# Patient Record
Sex: MALE | Race: WHITE | NOT HISPANIC OR LATINO | Employment: FULL TIME | URBAN - METROPOLITAN AREA
[De-identification: names, ages, dates, MRNs, and addresses within clinical notes are randomized per-mention and may not be internally consistent; named-entity substitution may affect disease eponyms.]

---

## 2017-08-01 ENCOUNTER — ALLSCRIPTS OFFICE VISIT (OUTPATIENT)
Dept: OTHER | Facility: OTHER | Age: 57
End: 2017-08-01

## 2017-08-01 LAB
BILIRUB UR QL STRIP: ABNORMAL
CLARITY UR: ABNORMAL
COLOR UR: YELLOW
GLUCOSE (HISTORICAL): ABNORMAL
HGB UR QL STRIP.AUTO: ABNORMAL
KETONES UR STRIP-MCNC: ABNORMAL MG/DL
LEUKOCYTE ESTERASE UR QL STRIP: ABNORMAL
NITRITE UR QL STRIP: ABNORMAL
PH UR STRIP.AUTO: 5.5 [PH]
PROT UR STRIP-MCNC: ABNORMAL MG/DL
SP GR UR STRIP.AUTO: 1.02
UROBILINOGEN UR QL STRIP.AUTO: 0.2

## 2017-08-03 ENCOUNTER — OFFICE VISIT (OUTPATIENT)
Dept: URGENT CARE | Age: 57
End: 2017-08-03
Payer: COMMERCIAL

## 2017-08-03 PROCEDURE — 99213 OFFICE O/P EST LOW 20 MIN: CPT

## 2017-12-29 ENCOUNTER — ALLSCRIPTS OFFICE VISIT (OUTPATIENT)
Dept: OTHER | Facility: OTHER | Age: 57
End: 2017-12-29

## 2017-12-29 LAB
BILIRUB UR QL STRIP: NORMAL
GLUCOSE (HISTORICAL): NORMAL
HGB UR QL STRIP.AUTO: NORMAL
KETONES UR STRIP-MCNC: NORMAL MG/DL
LEUKOCYTE ESTERASE UR QL STRIP: NORMAL
NITRITE UR QL STRIP: NORMAL
PH UR STRIP.AUTO: 6 [PH]
PROT UR STRIP-MCNC: NORMAL MG/DL
SP GR UR STRIP.AUTO: 1.02
UROBILINOGEN UR QL STRIP.AUTO: 0.2

## 2018-01-14 VITALS
BODY MASS INDEX: 25.84 KG/M2 | WEIGHT: 195 LBS | HEIGHT: 73 IN | DIASTOLIC BLOOD PRESSURE: 72 MMHG | SYSTOLIC BLOOD PRESSURE: 104 MMHG

## 2018-01-23 VITALS
DIASTOLIC BLOOD PRESSURE: 80 MMHG | WEIGHT: 195 LBS | HEIGHT: 72 IN | BODY MASS INDEX: 26.41 KG/M2 | RESPIRATION RATE: 16 BRPM | SYSTOLIC BLOOD PRESSURE: 120 MMHG

## 2018-02-01 DIAGNOSIS — R97.20 ELEVATED PROSTATE SPECIFIC ANTIGEN (PSA): ICD-10-CM

## 2018-02-14 ENCOUNTER — TELEPHONE (OUTPATIENT)
Dept: UROLOGY | Facility: AMBULATORY SURGERY CENTER | Age: 58
End: 2018-02-14

## 2018-02-14 NOTE — TELEPHONE ENCOUNTER
Isaias Hi from Dr Vikash Pete's office called  She needs this patient's prostate biopsy results from 2014 and 2016 faxed to her at [phone]  Dr Felix Powers did the biopsies  Please call her with any questions  Thanks

## 2018-03-08 ENCOUNTER — LAB REQUISITION (OUTPATIENT)
Dept: LAB | Facility: HOSPITAL | Age: 58
End: 2018-03-08
Payer: COMMERCIAL

## 2018-03-08 DIAGNOSIS — R97.20 ELEVATED PROSTATE SPECIFIC ANTIGEN (PSA): ICD-10-CM

## 2018-03-09 ENCOUNTER — TELEPHONE (OUTPATIENT)
Dept: UROLOGY | Facility: MEDICAL CENTER | Age: 58
End: 2018-03-09

## 2018-03-09 PROCEDURE — 88363 XM ARCHIVE TISSUE MOLEC ANAL: CPT | Performed by: PATHOLOGY

## 2018-06-21 LAB
ALBUMIN SERPL-MCNC: 4.5 G/DL (ref 3.5–5.5)
ALBUMIN/GLOB SERPL: 1.7 {RATIO} (ref 1.2–2.2)
ALP SERPL-CCNC: 57 IU/L (ref 39–117)
ALT SERPL-CCNC: 24 IU/L (ref 0–44)
AMBIG ABBREV DEFAULT: NORMAL
AST SERPL-CCNC: 21 IU/L (ref 0–40)
BILIRUB SERPL-MCNC: 0.5 MG/DL (ref 0–1.2)
BUN SERPL-MCNC: 21 MG/DL (ref 6–24)
BUN/CREAT SERPL: 16 (ref 9–20)
CALCIUM SERPL-MCNC: 9.5 MG/DL (ref 8.7–10.2)
CHLORIDE SERPL-SCNC: 101 MMOL/L (ref 96–106)
CO2 SERPL-SCNC: 22 MMOL/L (ref 20–29)
CREAT SERPL-MCNC: 1.32 MG/DL (ref 0.76–1.27)
GLOBULIN SER-MCNC: 2.6 G/DL (ref 1.5–4.5)
GLUCOSE SERPL-MCNC: 89 MG/DL (ref 65–99)
POTASSIUM SERPL-SCNC: 4.5 MMOL/L (ref 3.5–5.2)
PROT SERPL-MCNC: 7.1 G/DL (ref 6–8.5)
PSA FREE MFR SERPL: 34.4 %
PSA FREE SERPL-MCNC: 2.75 NG/ML
PSA SERPL-MCNC: 8 NG/ML (ref 0–4)
SL AMB EGFR AFRICAN AMERICAN: 69 ML/MIN/1.73
SL AMB EGFR NON AFRICAN AMERICAN: 60 ML/MIN/1.73
SODIUM SERPL-SCNC: 141 MMOL/L (ref 134–144)

## 2018-06-25 RX ORDER — MULTIVITAMIN WITH IRON
TABLET ORAL
COMMUNITY
Start: 2017-12-29 | End: 2019-08-12

## 2018-06-25 RX ORDER — IBUPROFEN 800 MG
TABLET ORAL
COMMUNITY
Start: 2017-12-29 | End: 2019-08-12

## 2018-06-29 ENCOUNTER — OFFICE VISIT (OUTPATIENT)
Dept: UROLOGY | Facility: MEDICAL CENTER | Age: 58
End: 2018-06-29
Payer: COMMERCIAL

## 2018-06-29 VITALS
SYSTOLIC BLOOD PRESSURE: 108 MMHG | DIASTOLIC BLOOD PRESSURE: 80 MMHG | WEIGHT: 190 LBS | HEIGHT: 72 IN | BODY MASS INDEX: 25.73 KG/M2

## 2018-06-29 DIAGNOSIS — Z90.5 HISTORY OF NEPHRECTOMY: ICD-10-CM

## 2018-06-29 DIAGNOSIS — N40.1 BENIGN PROSTATIC HYPERPLASIA WITH LOWER URINARY TRACT SYMPTOMS, SYMPTOM DETAILS UNSPECIFIED: ICD-10-CM

## 2018-06-29 DIAGNOSIS — Z85.528 PERSONAL HISTORY OF OTHER MALIGNANT NEOPLASM OF KIDNEY: ICD-10-CM

## 2018-06-29 DIAGNOSIS — R97.20 ELEVATED PSA: Primary | ICD-10-CM

## 2018-06-29 LAB
SL AMB  POCT GLUCOSE, UA: ABNORMAL
SL AMB LEUKOCYTE ESTERASE,UA: ABNORMAL
SL AMB POCT BILIRUBIN,UA: ABNORMAL
SL AMB POCT BLOOD,UA: ABNORMAL
SL AMB POCT CLARITY,UA: CLEAR
SL AMB POCT COLOR,UA: YELLOW
SL AMB POCT KETONES,UA: ABNORMAL
SL AMB POCT NITRITE,UA: ABNORMAL
SL AMB POCT PH,UA: 5.5
SL AMB POCT SPECIFIC GRAVITY,UA: 1.02
SL AMB POCT URINE PROTEIN: ABNORMAL
SL AMB POCT UROBILINOGEN: 0.2

## 2018-06-29 PROCEDURE — 81003 URINALYSIS AUTO W/O SCOPE: CPT | Performed by: UROLOGY

## 2018-06-29 PROCEDURE — 99214 OFFICE O/P EST MOD 30 MIN: CPT | Performed by: UROLOGY

## 2018-06-29 NOTE — PROGRESS NOTES
Assessment/Plan:      Diagnoses and all orders for this visit:    Elevated PSA    Benign prostatic hyperplasia with lower urinary tract symptoms, symptom details unspecified  -     POCT urine dip auto non-scope    Personal history of other malignant neoplasm of kidney  -     Comprehensive metabolic panel; Future  -     CT abdomen pelvis w contrast; Future  -     XR chest pa & lateral; Future    History of nephrectomy    Other orders  -     Cholecalciferol (VITAMIN D3) 400 units CAPS; Take by mouth  -     Omega-3 Fatty Acids (FISH OIL CONCENTRATE) 300 MG CAPS; Take by mouth          Subjective:  No complaints     Patient ID: Raine Stephenson is a 65 y o  male  HPI  He is 2 years after his laparoscopic robotic right radical nephrectomy for pT1a RCC  He has no remarkable related problems or complaints  He denies any hematuria flank pains weight loss or loss of appetite  He has a history of BPH and we had had him on Flomax previously but he is currently voiding well without being on Flomax  He denies any obstructive urinary symptoms, and he has not had any problem getting his stream started or emptying his bladder  He has had elevated PSA between 8 and 9 for the past few years  He is undergone at least 2 negative prostate biopsies previously, the last of which was in 2016  Review of Systems   Constitutional: Negative  HENT: Negative  Eyes: Negative  Respiratory: Negative  Cardiovascular: Negative  Gastrointestinal: Negative  Endocrine: Negative  Genitourinary: Negative  Musculoskeletal: Negative  Skin: Negative  Allergic/Immunologic: Negative  Neurological: Negative  Hematological: Negative  Psychiatric/Behavioral: Negative  Objective:     Physical Exam   Constitutional: He is oriented to person, place, and time  He appears well-developed and well-nourished  No distress  HENT:   Head: Normocephalic and atraumatic     Nose: Nose normal    Mouth/Throat: Oropharynx is clear and moist    Eyes: Conjunctivae are normal  No scleral icterus  Neck: Normal range of motion  Neck supple  Pulmonary/Chest: Effort normal  No respiratory distress  Abdominal: Soft  Bowel sounds are normal  He exhibits no distension  There is no CVA tenderness  No hernia  Hernia confirmed negative in the ventral area  Musculoskeletal: Normal range of motion  He exhibits no edema or tenderness  Lymphadenopathy:     He has no cervical adenopathy  Neurological: He is alert and oriented to person, place, and time  No cranial nerve deficit  Skin: Skin is warm and dry  No erythema  No pallor  Psychiatric: He has a normal mood and affect  His behavior is normal  Judgment and thought content normal    Vitals reviewed        PSA from 06/20/2018 was 8 0 with a percent free of 34 4%, which is stable for him    His last surveillance imaging studies were from 1 year ago

## 2018-07-05 ENCOUNTER — HOSPITAL ENCOUNTER (OUTPATIENT)
Dept: RADIOLOGY | Facility: HOSPITAL | Age: 58
Discharge: HOME | End: 2018-07-05
Attending: UROLOGY
Payer: COMMERCIAL

## 2018-07-05 DIAGNOSIS — Z85.528 PERSONAL HISTORY OF OTHER MALIGNANT NEOPLASM OF KIDNEY: ICD-10-CM

## 2018-07-05 PROCEDURE — 71046 X-RAY EXAM CHEST 2 VIEWS: CPT

## 2018-07-18 ENCOUNTER — HOSPITAL ENCOUNTER (OUTPATIENT)
Dept: CT IMAGING | Facility: HOSPITAL | Age: 58
Discharge: HOME | End: 2018-07-18
Attending: UROLOGY
Payer: COMMERCIAL

## 2018-07-18 DIAGNOSIS — Z85.528 PERSONAL HISTORY OF OTHER MALIGNANT NEOPLASM OF KIDNEY: ICD-10-CM

## 2018-07-18 PROCEDURE — 74177 CT ABD & PELVIS W/CONTRAST: CPT

## 2018-07-18 RX ADMIN — IODIXANOL 100 ML: 320 INJECTION, SOLUTION INTRAVASCULAR at 07:50

## 2018-07-20 ENCOUNTER — TELEPHONE (OUTPATIENT)
Dept: OTHER | Facility: HOSPITAL | Age: 58
End: 2018-07-20

## 2018-07-23 ENCOUNTER — TELEPHONE (OUTPATIENT)
Dept: UROLOGY | Facility: MEDICAL CENTER | Age: 58
End: 2018-07-23

## 2018-07-23 NOTE — TELEPHONE ENCOUNTER
Patient called back to say the nurse stated the name was Kishor James on the answering machine, but he sees Lor  I assured him from the notes left by Lauren Pringle that Lor approved her calling him on his behalf

## 2019-01-11 ENCOUNTER — OFFICE VISIT (OUTPATIENT)
Dept: UROLOGY | Facility: MEDICAL CENTER | Age: 59
End: 2019-01-11
Payer: COMMERCIAL

## 2019-01-11 VITALS
WEIGHT: 188 LBS | SYSTOLIC BLOOD PRESSURE: 130 MMHG | HEIGHT: 72 IN | BODY MASS INDEX: 25.47 KG/M2 | HEART RATE: 72 BPM | DIASTOLIC BLOOD PRESSURE: 82 MMHG

## 2019-01-11 DIAGNOSIS — Z90.5 HISTORY OF NEPHRECTOMY: ICD-10-CM

## 2019-01-11 DIAGNOSIS — N40.1 BENIGN PROSTATIC HYPERPLASIA WITH LOWER URINARY TRACT SYMPTOMS, SYMPTOM DETAILS UNSPECIFIED: ICD-10-CM

## 2019-01-11 DIAGNOSIS — R97.20 ELEVATED PSA: Primary | ICD-10-CM

## 2019-01-11 DIAGNOSIS — Z85.528 PERSONAL HISTORY OF OTHER MALIGNANT NEOPLASM OF KIDNEY: ICD-10-CM

## 2019-01-11 DIAGNOSIS — R31.29 MICROSCOPIC HEMATURIA: ICD-10-CM

## 2019-01-11 LAB
SL AMB  POCT GLUCOSE, UA: NEGATIVE
SL AMB LEUKOCYTE ESTERASE,UA: NEGATIVE
SL AMB POCT BILIRUBIN,UA: NEGATIVE
SL AMB POCT BLOOD,UA: ABNORMAL
SL AMB POCT CLARITY,UA: CLEAR
SL AMB POCT COLOR,UA: YELLOW
SL AMB POCT KETONES,UA: NEGATIVE
SL AMB POCT NITRITE,UA: NEGATIVE
SL AMB POCT PH,UA: 6
SL AMB POCT SPECIFIC GRAVITY,UA: 1.02
SL AMB POCT URINE PROTEIN: NEGATIVE
SL AMB POCT UROBILINOGEN: 0.2

## 2019-01-11 PROCEDURE — 99214 OFFICE O/P EST MOD 30 MIN: CPT | Performed by: UROLOGY

## 2019-01-11 PROCEDURE — 81003 URINALYSIS AUTO W/O SCOPE: CPT | Performed by: UROLOGY

## 2019-01-11 NOTE — PROGRESS NOTES
Assessment/Plan:      Diagnoses and all orders for this visit:    Elevated PSA  -     PSA, total and free; Future    Benign prostatic hyperplasia with lower urinary tract symptoms, symptom details unspecified  -     PSA, total and free; Future    Microscopic hematuria  -     POCT urine dip auto non-scope    Personal history of other malignant neoplasm of kidney  -     US kidney and bladder; Future    History of nephrectomy  -     US kidney and bladder; Future          Subjective:  No complaints     Patient ID: Authur Sever is a 63 y o  male  HPI  He is 2-1/2 years after his laparoscopic robotic right radical nephrectomy for pT1a RCC  He has no remarkable related problems or complaints  He denies any hematuria flank pains weight loss or loss of appetite      He has a history of BPH and we had had him on Flomax previously but he is currently voiding well without being on Flomax  He denies any obstructive urinary symptoms, and he has not had any problem getting his stream started or emptying his bladder  He has had elevated PSA between 8 and 9 for the past few years  He is undergone at least 2 negative prostate biopsies previously, the last of which was in 2016  Review of Systems   Constitutional: Negative  HENT: Negative  Eyes: Negative  Respiratory: Negative  Cardiovascular: Negative  Gastrointestinal: Negative  Endocrine: Negative  Genitourinary: Negative  Musculoskeletal: Negative  Skin: Negative  Allergic/Immunologic: Negative  Neurological: Negative  Hematological: Negative  Psychiatric/Behavioral: Negative  Objective:     Physical Exam   Constitutional: He is oriented to person, place, and time  He appears well-developed and well-nourished  No distress  HENT:   Head: Normocephalic and atraumatic  Nose: Nose normal    Mouth/Throat: Oropharynx is clear and moist    Eyes: Pupils are equal, round, and reactive to light   Conjunctivae and EOM are normal  No scleral icterus  Neck: Normal range of motion  Neck supple  Cardiovascular: Normal rate, regular rhythm, normal heart sounds and intact distal pulses  No murmur heard  Pulmonary/Chest: Effort normal and breath sounds normal  No respiratory distress  He has no wheezes  He has no rales  Abdominal: Soft  Bowel sounds are normal  He exhibits no distension and no mass  There is no tenderness  Musculoskeletal: Normal range of motion  He exhibits no edema or tenderness  Lymphadenopathy:     He has no cervical adenopathy  Neurological: He is alert and oriented to person, place, and time  No cranial nerve deficit  Skin: Skin is warm and dry  No rash noted  No erythema  No pallor  Psychiatric: He has a normal mood and affect  His behavior is normal  Judgment and thought content normal    Nursing note and vitals reviewed  CT scan of the abdomen pelvis, and chest x-ray from 07/18/2018: IMPRESSION:  No signs of metastatic disease, residual or recurrent renal cell carcinoma in this patient status post right nephrectomy  Prostamegaly  Colonic diverticulosis without diverticulitis    IMPRESSION:  No acute cardiopulmonary disease    PSA from 06/20/2018 was 8 0, with a% free of 34 4 %    Patient states a few weeks ago he had a blood test done through his medical doctor's office and his PSA was 9 9

## 2019-01-11 NOTE — LETTER
January 11, 2019     [email or web address]  [address] [place] [address]    Patient: Lisha Wood   YOB: 1960   Date of Visit: 1/11/2019       Dear Dr Luann Rousseau:    Thank you for referring Sharon Yu to me for evaluation  Below are my notes for this consultation  If you have questions, please do not hesitate to call me  I look forward to following your patient along with you           Sincerely,        Brandi Loredo MD        CC: No Recipients

## 2019-06-21 ENCOUNTER — TELEPHONE (OUTPATIENT)
Dept: UROLOGY | Facility: MEDICAL CENTER | Age: 59
End: 2019-06-21

## 2019-08-12 ENCOUNTER — OFFICE VISIT (OUTPATIENT)
Dept: UROLOGY | Facility: MEDICAL CENTER | Age: 59
End: 2019-08-12
Payer: COMMERCIAL

## 2019-08-12 ENCOUNTER — TELEPHONE (OUTPATIENT)
Dept: UROLOGY | Facility: MEDICAL CENTER | Age: 59
End: 2019-08-12

## 2019-08-12 VITALS
HEART RATE: 58 BPM | SYSTOLIC BLOOD PRESSURE: 126 MMHG | HEIGHT: 72 IN | WEIGHT: 192 LBS | DIASTOLIC BLOOD PRESSURE: 78 MMHG | BODY MASS INDEX: 26.01 KG/M2

## 2019-08-12 DIAGNOSIS — Z98.890 STATUS POST ROBOT-ASSISTED SURGICAL PROCEDURE: ICD-10-CM

## 2019-08-12 DIAGNOSIS — R97.20 ELEVATED PSA: ICD-10-CM

## 2019-08-12 DIAGNOSIS — Z90.5 HISTORY OF RIGHT RADICAL NEPHRECTOMY: ICD-10-CM

## 2019-08-12 DIAGNOSIS — N40.1 BENIGN PROSTATIC HYPERPLASIA WITH LOWER URINARY TRACT SYMPTOMS, SYMPTOM DETAILS UNSPECIFIED: Primary | ICD-10-CM

## 2019-08-12 DIAGNOSIS — Z85.528 PERSONAL HISTORY OF OTHER MALIGNANT NEOPLASM OF KIDNEY: ICD-10-CM

## 2019-08-12 PROCEDURE — 99214 OFFICE O/P EST MOD 30 MIN: CPT | Performed by: UROLOGY

## 2019-08-12 PROCEDURE — 81003 URINALYSIS AUTO W/O SCOPE: CPT | Performed by: UROLOGY

## 2019-08-12 NOTE — PROGRESS NOTES
Assessment/Plan:      Diagnoses and all orders for this visit:    Benign prostatic hyperplasia with lower urinary tract symptoms, symptom details unspecified  -     POCT urine dip auto non-scope    Elevated PSA  -     PSA, total and free; Future  -     Comprehensive metabolic panel; Future  -     MRI prostate multiparametric wo w contrast; Future    Personal history of other malignant neoplasm of kidney    History of right radical nephrectomy    Status post robot-assisted surgical procedure          Subjective:  No complaints     Patient ID: Jose Egan is a 63 y o  male  HPI  He is 3 years after his laparoscopic robotic right radical nephrectomy for pT1a [facility] Fitting has no remarkable related problems or complaints   He denies any hematuria flank pains weight loss or loss of appetite      He has a history of BPH and we had had him on Flomax previously but he is currently voiding well without being on Flomax   He denies any obstructive urinary symptoms, and he has not had any problem getting his stream started or emptying his bladder  He has had elevated PSA between 8 and 9 for the past few years  [facility] is undergone at least 2 negative prostate biopsies previously, the last of which was in 2016  Review of Systems   Constitutional: Negative  HENT: Negative  Eyes: Negative  Respiratory: Negative  Cardiovascular: Negative  Gastrointestinal: Negative  Endocrine: Negative  Genitourinary: Negative  Musculoskeletal: Negative  Skin: Negative  Allergic/Immunologic: Negative  Neurological: Negative  Hematological: Negative  Psychiatric/Behavioral: Negative  Objective:     Physical Exam   Constitutional: He is oriented to person, place, and time  He appears well-developed and well-nourished  No distress  HENT:   Head: Normocephalic and atraumatic  Nose: Nose normal    Mouth/Throat: Oropharynx is clear and moist    Eyes: Pupils are equal, round, and reactive to light  Conjunctivae and EOM are normal  No scleral icterus  Neck: Normal range of motion  Neck supple  Cardiovascular: Normal rate, regular rhythm, normal heart sounds and intact distal pulses  No murmur heard  Pulmonary/Chest: Effort normal and breath sounds normal  No respiratory distress  He has no wheezes  He has no rales  Abdominal: Soft  Bowel sounds are normal  He exhibits no distension and no mass  There is no tenderness  Musculoskeletal: Normal range of motion  He exhibits no edema or tenderness  Lymphadenopathy:     He has no cervical adenopathy  Neurological: He is alert and oriented to person, place, and time  No cranial nerve deficit  Skin: Skin is warm and dry  No rash noted  No erythema  No pallor  Psychiatric: He has a normal mood and affect  His behavior is normal  Judgment and thought content normal    Nursing note and vitals reviewed        PSA from 08/06/2019 was 8 8    Renal ultrasound from 07/06/2019:  Normal left kidney, status post right nephrectomy

## 2019-08-19 ENCOUNTER — TELEPHONE (OUTPATIENT)
Dept: UROLOGY | Facility: MEDICAL CENTER | Age: 59
End: 2019-08-19

## 2019-08-19 NOTE — TELEPHONE ENCOUNTER
Patient of Dr Rahel Maurer  Patient's wife called  Patient would like to have some type of medication to relax him and reduce the anxiety of having an MRI  MRI is scheduled for 08/30/19  She can be reached at [phone]

## 2019-08-20 DIAGNOSIS — R97.20 ELEVATED PSA: Primary | ICD-10-CM

## 2019-08-20 RX ORDER — ALPRAZOLAM 0.5 MG/1
0.5 TABLET ORAL ONCE
Qty: 1 TABLET | Refills: 0 | Status: SHIPPED | OUTPATIENT
Start: 2019-08-20 | End: 2022-01-26

## 2019-08-20 NOTE — TELEPHONE ENCOUNTER
Tell patient's wife I will sent a prescription to his pharmacy for a Xanax tablet  He should take it 20 minutes before procedure  He cannot drive if he takes the medication, so he needs to be driven to and from the MRI facility

## 2019-09-10 LAB
ALBUMIN SERPL-MCNC: 4.4 G/DL (ref 3.5–5.5)
ALBUMIN/GLOB SERPL: 1.8 {RATIO} (ref 1.2–2.2)
ALP SERPL-CCNC: 56 IU/L (ref 39–117)
ALT SERPL-CCNC: 16 IU/L (ref 0–44)
AST SERPL-CCNC: 19 IU/L (ref 0–40)
BILIRUB SERPL-MCNC: 0.7 MG/DL (ref 0–1.2)
BUN SERPL-MCNC: 19 MG/DL (ref 6–24)
BUN/CREAT SERPL: 14 (ref 9–20)
CALCIUM SERPL-MCNC: 9.8 MG/DL (ref 8.7–10.2)
CHLORIDE SERPL-SCNC: 100 MMOL/L (ref 96–106)
CO2 SERPL-SCNC: 25 MMOL/L (ref 20–29)
CREAT SERPL-MCNC: 1.32 MG/DL (ref 0.76–1.27)
GLOBULIN SER-MCNC: 2.5 G/DL (ref 1.5–4.5)
GLUCOSE SERPL-MCNC: 86 MG/DL (ref 65–99)
POTASSIUM SERPL-SCNC: 4.3 MMOL/L (ref 3.5–5.2)
PROT SERPL-MCNC: 6.9 G/DL (ref 6–8.5)
SL AMB EGFR AFRICAN AMERICAN: 69 ML/MIN/1.73
SL AMB EGFR NON AFRICAN AMERICAN: 59 ML/MIN/1.73
SODIUM SERPL-SCNC: 138 MMOL/L (ref 134–144)

## 2019-09-19 ENCOUNTER — TELEPHONE (OUTPATIENT)
Dept: UROLOGY | Facility: MEDICAL CENTER | Age: 59
End: 2019-09-19

## 2019-09-19 NOTE — TELEPHONE ENCOUNTER
Spoke with the patient to let him know the order stays the same and gave him the CPT codes to check with his insurance to see how much he would be responsible for

## 2019-09-19 NOTE — TELEPHONE ENCOUNTER
Patient of Dr Fabienne Brooke seen in [facility] office  Patient called regarding possible need for new prescription for test and the procedure code as well  He can be reached at [phone]    Thank you

## 2019-09-30 ENCOUNTER — HOSPITAL ENCOUNTER (OUTPATIENT)
Dept: RADIOLOGY | Age: 59
Discharge: HOME | End: 2019-09-30
Payer: COMMERCIAL

## 2019-09-30 DIAGNOSIS — R97.20 ELEVATED PSA: ICD-10-CM

## 2019-09-30 PROCEDURE — 76377 3D RENDER W/INTRP POSTPROCES: CPT

## 2019-09-30 PROCEDURE — 72197 MRI PELVIS W/O & W/DYE: CPT

## 2019-09-30 PROCEDURE — A9585 GADOBUTROL INJECTION: HCPCS | Performed by: UROLOGY

## 2019-09-30 RX ADMIN — GADOBUTROL 7 ML: 604.72 INJECTION INTRAVENOUS at 16:00

## 2019-10-03 ENCOUNTER — TELEPHONE (OUTPATIENT)
Dept: UROLOGY | Facility: MEDICAL CENTER | Age: 59
End: 2019-10-03

## 2019-10-03 NOTE — TELEPHONE ENCOUNTER
Please call patient inform him that the results of it is MRI are negative for any substantial or abnormal findings   He needs nothing done at this time, and we can discuss this further when he returns for his next scheduled office visit  Pt notified

## 2020-07-01 ENCOUNTER — TELEPHONE (OUTPATIENT)
Dept: UROLOGY | Facility: MEDICAL CENTER | Age: 60
End: 2020-07-01

## 2020-07-01 NOTE — TELEPHONE ENCOUNTER
Patient managed by Dr Emmy Wilson seen at [facility]  Patients spouse Salbador Dover called requesting to reschedule appointment originally   scheduled back 2/19/202   Salbador Dover can be reached at [phone]

## 2020-07-02 NOTE — TELEPHONE ENCOUNTER
Left detailed message for pt's spouse  Dr Roldan Bartlett first opening is July 23rd at 21 [phone] which I put patient in  Patient's spouse to call back if appt is ok or needs to RS

## 2020-07-21 ENCOUNTER — TELEPHONE (OUTPATIENT)
Dept: UROLOGY | Facility: MEDICAL CENTER | Age: 60
End: 2020-07-21

## 2020-09-16 ENCOUNTER — OFFICE VISIT (OUTPATIENT)
Dept: UROLOGY | Facility: MEDICAL CENTER | Age: 60
End: 2020-09-16
Payer: COMMERCIAL

## 2020-09-16 VITALS
HEIGHT: 73 IN | SYSTOLIC BLOOD PRESSURE: 140 MMHG | BODY MASS INDEX: 26.37 KG/M2 | TEMPERATURE: 97.2 F | WEIGHT: 199 LBS | DIASTOLIC BLOOD PRESSURE: 80 MMHG

## 2020-09-16 DIAGNOSIS — Z98.890 STATUS POST ROBOT-ASSISTED SURGICAL PROCEDURE: ICD-10-CM

## 2020-09-16 DIAGNOSIS — R97.20 ELEVATED PSA: ICD-10-CM

## 2020-09-16 DIAGNOSIS — Z85.528 PERSONAL HISTORY OF OTHER MALIGNANT NEOPLASM OF KIDNEY: Primary | ICD-10-CM

## 2020-09-16 DIAGNOSIS — Z90.5 HISTORY OF RIGHT RADICAL NEPHRECTOMY: ICD-10-CM

## 2020-09-16 DIAGNOSIS — N40.1 BENIGN PROSTATIC HYPERPLASIA WITH LOWER URINARY TRACT SYMPTOMS, SYMPTOM DETAILS UNSPECIFIED: ICD-10-CM

## 2020-09-16 LAB
SL AMB  POCT GLUCOSE, UA: ABNORMAL
SL AMB LEUKOCYTE ESTERASE,UA: ABNORMAL
SL AMB POCT BILIRUBIN,UA: ABNORMAL
SL AMB POCT BLOOD,UA: ABNORMAL
SL AMB POCT CLARITY,UA: ABNORMAL
SL AMB POCT COLOR,UA: YELLOW
SL AMB POCT KETONES,UA: ABNORMAL
SL AMB POCT NITRITE,UA: ABNORMAL
SL AMB POCT PH,UA: 5
SL AMB POCT SPECIFIC GRAVITY,UA: 1.03
SL AMB POCT URINE PROTEIN: ABNORMAL
SL AMB POCT UROBILINOGEN: 0.2

## 2020-09-16 PROCEDURE — 81003 URINALYSIS AUTO W/O SCOPE: CPT | Performed by: UROLOGY

## 2020-09-16 PROCEDURE — 99215 OFFICE O/P EST HI 40 MIN: CPT | Performed by: UROLOGY

## 2020-09-16 NOTE — PROGRESS NOTES
Assessment/Plan:      Diagnoses and all orders for this visit:    Personal history of other malignant neoplasm of kidney    History of right radical nephrectomy    Status post robot-assisted surgical procedure    Elevated PSA  -     PSA Total, Diagnostic; Future  -     Comprehensive metabolic panel; Future  -     MRI prostate multiparametric wo w contrast; Future  -     PSA, total and free; Future    Benign prostatic hyperplasia with lower urinary tract symptoms, symptom details unspecified  -     PSA Total, Diagnostic; Future        Assessment/Plan:  Will obtain MRI to examine the lobulation of the prostate seen on ultrasound      Subjective:  No complaints     Patient ID: Frederick Wiggins is a 63 y o  male  HPI  He is 4 years after his laparoscopic robotic right radical nephrectomy for pT1a Kwasi Spivey has no remarkable related problems or complaints   He denies any hematuria flank pains weight loss or loss of appetite      He has a history of BPH and we had had him on Flomax previously but he is currently voiding well without being on Flomax   He denies any obstructive urinary symptoms, and he has not had any problem getting his stream started or emptying his bladder  He has had elevated PSA between 8 and 9 for the past few years  Min Jeffers is undergone at least 2 negative prostate biopsies previously, the last of which was in 2016      Review of Systems   Constitutional: Negative  HENT: Negative  Eyes: Negative  Respiratory: Negative  Cardiovascular: Negative  Gastrointestinal: Negative  Endocrine: Negative  Genitourinary: Negative  Negative for difficulty urinating, flank pain and hematuria  Musculoskeletal: Negative  Skin: Negative  Allergic/Immunologic: Negative  Neurological: Negative  Hematological: Negative  Psychiatric/Behavioral: Negative  Objective:     Physical Exam  Vitals signs and nursing note reviewed     Constitutional:       General: He is not in acute distress  Appearance: He is well-developed  HENT:      Head: Normocephalic and atraumatic  Nose: Nose normal    Eyes:      General: No scleral icterus  Conjunctiva/sclera: Conjunctivae normal       Pupils: Pupils are equal, round, and reactive to light  Neck:      Musculoskeletal: Normal range of motion and neck supple  Pulmonary:      Effort: Pulmonary effort is normal  No respiratory distress  Breath sounds: Normal breath sounds  Abdominal:      General: Bowel sounds are normal  There is no distension  Palpations: Abdomen is soft  There is no mass  Tenderness: There is no abdominal tenderness  Musculoskeletal: Normal range of motion  General: No tenderness  Skin:     General: Skin is warm and dry  Coloration: Skin is not pale  Findings: No erythema or rash  Neurological:      Mental Status: He is alert and oriented to person, place, and time  Cranial Nerves: No cranial nerve deficit  Psychiatric:         Behavior: Behavior normal          Thought Content: Thought content normal          Judgment: Judgment normal          Renal ultrasound from 08/17/2020:  Status post right nephrectomy with no recurrence in the nephrectomy bed  No evidence of metastatic disease    Lobulation of the prostate

## 2020-10-01 ENCOUNTER — TELEPHONE (OUTPATIENT)
Dept: UROLOGY | Facility: MEDICAL CENTER | Age: 60
End: 2020-10-01

## 2020-10-07 ENCOUNTER — HOSPITAL ENCOUNTER (OUTPATIENT)
Dept: RADIOLOGY | Age: 60
Discharge: HOME | End: 2020-10-07
Payer: COMMERCIAL

## 2020-10-07 DIAGNOSIS — R97.20 ELEVATED PSA: ICD-10-CM

## 2020-10-07 PROCEDURE — A9585 GADOBUTROL INJECTION: HCPCS | Performed by: UROLOGY

## 2020-10-07 PROCEDURE — 76377 3D RENDER W/INTRP POSTPROCES: CPT

## 2020-10-07 PROCEDURE — G1004 CDSM NDSC: HCPCS

## 2020-10-07 PROCEDURE — 72197 MRI PELVIS W/O & W/DYE: CPT

## 2020-10-07 RX ADMIN — GADOBUTROL 9 ML: 604.72 INJECTION INTRAVENOUS at 08:58

## 2021-03-10 DIAGNOSIS — Z23 ENCOUNTER FOR IMMUNIZATION: ICD-10-CM

## 2021-06-14 PROBLEM — Z85.528 PERSONAL HISTORY OF OTHER MALIGNANT NEOPLASM OF KIDNEY: Status: ACTIVE | Noted: 2021-06-14

## 2021-06-14 PROBLEM — N40.1 BENIGN PROSTATIC HYPERPLASIA WITH LOWER URINARY TRACT SYMPTOMS: Status: ACTIVE | Noted: 2021-06-14

## 2021-06-14 NOTE — PROGRESS NOTES
Referring Physician: No primary care provider on file  A copy of this note was sent to the referring physician  Diagnoses and all orders for this visit:    Personal history of other malignant neoplasm of kidney    Benign prostatic hyperplasia with lower urinary tract symptoms, symptom details unspecified  -     PSA, Total Screen; Future    Elevated PSA  -     PSA, Total Screen; Future    Renal cell adenocarcinoma, right (HCC)            Assessment and plan:       1  Renal cell carcinoma  -pT1a  -status post radical right nephrectomy (Dr Prashant Diaz) (3/2018)    2  Elevated PSA  -status post 2-prostate biopsies, most recently 2016  -Current PSA 8 9, stable from 2019 ( 8 8 at that time)    3  Lower urinary tract symptoms  -currently off of medical management      It was a pleasure to evaluate the patient  His urinary symptoms have worsened to a degree and he has inquired about medical and surgical options for managing his high volume BPH  Today, we discussed a stepwise approach in treating lower urinary tract symptoms associated with BPH  Lower urinary tract symptoms (LUTS) can be sub-divided into those that result from failure of the bladder to store urine normally ("storage symptoms"), those that result from difficulties in emptying ("voiding symptoms"), or those that follow micturition ("post-micturition symptoms")  Obstructive symptoms such as hesitancy, weak stream, and pushing to urinate are classified as voiding symptoms  OAB is due to the inability of the bladder to store urine normally  The symptoms of frequency, urgency, nocturia, and urge incontinence are classified as storage symptoms  I discussed the mainstays of therapy for voiding symptoms including alpha blockers, 5-alpha reductase inhibitors, and surgical management including TURP (laser, monopolar, bipolar, button electrode), TUIP, and prostatectomy   I had a candid discussion about the risks of each of these medications and the mechanism of action  I also discussed the use of PD5 inhibitors for LUTS  Ultimately he elected to continue with watchful waiting without any medical therapy at the present time  He understands a cystoscopy and transrectal ultrasound could be pursued at some point as well  He will follow up annually with advanced practitioner team with a PSA prior or sooner if ever needed  He requires no further radiographic surveillance for his stage I kidney cancer status post resection in 2018    Verner Squires, MD      Chief Complaint      BPH, kidney cancer follow-up      History of Present Illness     Frederick Wiggins is a 62 y o  Male returns in follow-up  He has previously managed by my colleague Dr Ashley Grissom  In brief patient has a longstanding history of elevated PSA secondary to high volume BPH  He presents with a updated PSA which is stable at 8 9, measuring similar value back in 2019  He was also noted to have stage I kidney cancer having undergone a radical nephrectomy 2018  He was followed for the appropriate surveillance interval with no evidence of recurrent disease  He is generally asymptomatic voiding standpoint  He does feel that he has some mild occasional nocturia and some urinary intermittency  AUA symptom score today is 6 with a bothersome index of 1    Detailed Urologic History     - please refer to HPI    Review of Systems     Review of Systems   Constitutional: Negative for activity change and fatigue  HENT: Negative for congestion  Eyes: Negative for visual disturbance  Respiratory: Negative for shortness of breath and wheezing  Cardiovascular: Negative for chest pain and leg swelling  Gastrointestinal: Negative for abdominal pain  Endocrine: Positive for polyuria  Genitourinary: Positive for dysuria  Negative for flank pain, hematuria and urgency  Musculoskeletal: Negative for back pain     Allergic/Immunologic: Negative for immunocompromised state  Neurological: Negative for dizziness and numbness  Psychiatric/Behavioral: Negative for dysphoric mood  All other systems reviewed and are negative  Allergies     No Known Allergies    Physical Exam     Physical Exam  Constitutional:       General: He is not in acute distress  Appearance: He is well-developed  HENT:      Head: Normocephalic and atraumatic  Cardiovascular:      Comments: Negative lower extremity edema  Pulmonary:      Effort: Pulmonary effort is normal       Breath sounds: Normal breath sounds  Abdominal:      Palpations: Abdomen is soft  Genitourinary:     Comments: 80 g gland no nodules or induration  Musculoskeletal:         General: Normal range of motion  Cervical back: Normal range of motion  Skin:     General: Skin is warm  Neurological:      Mental Status: He is alert and oriented to person, place, and time  Psychiatric:         Behavior: Behavior normal              Vital Signs  There were no vitals filed for this visit        Current Medications       Current Outpatient Medications:     ALPRAZolam (XANAX) 0 5 mg tablet, Take 1 tablet (0 5 mg total) by mouth once for 1 dose, Disp: 1 tablet, Rfl: 0      Active Problems     Patient Active Problem List   Diagnosis    Personal history of other malignant neoplasm of kidney    Benign prostatic hyperplasia with lower urinary tract symptoms         Past Medical History     Past Medical History:   Diagnosis Date    Acquired absence of kidney     Acute prostatitis     BPH with obstruction/lower urinary tract symptoms     BPH without obstruction/lower urinary tract symptoms     Elevated PSA     Family history of malignant neoplasm of kidney     Malignant neoplasm of unspecified kidney, except renal pelvis (HCC)     Other microscopic hematuria     Other specified disorders of kidney and ureter     Personal history of other malignant neoplasm of kidney          Surgical History Past Surgical History:   Procedure Laterality Date    PROSTATE BIOPSY      RADICAL NEPHRECTOMY Right 2016    VASECTOMY           Family History     Family History   Problem Relation Age of Onset    Breast cancer Mother     Aneurysm Father         brain         Social History     Social History     Social History     Tobacco Use   Smoking Status Never Smoker   Smokeless Tobacco Never Used         Pertinent Lab Values     Lab Results   Component Value Date    CREATININE 1 32 (H) 09/10/2019       Lab Results   Component Value Date    PSA 8 0 (H) 06/20/2018       @RESULTRCNT(1H])@      Pertinent Imaging      - n/a    Portions of the record may have been created with voice recognition software   Occasional wrong word or "sound a like" substitutions may have occurred due to the inherent limitations of voice recognition software   Read the chart carefully and recognize, using context, where substitutions have occurred

## 2021-06-15 ENCOUNTER — OFFICE VISIT (OUTPATIENT)
Dept: UROLOGY | Facility: CLINIC | Age: 61
End: 2021-06-15
Payer: COMMERCIAL

## 2021-06-15 VITALS
DIASTOLIC BLOOD PRESSURE: 82 MMHG | SYSTOLIC BLOOD PRESSURE: 112 MMHG | HEIGHT: 73 IN | HEART RATE: 70 BPM | BODY MASS INDEX: 24.78 KG/M2 | WEIGHT: 187 LBS

## 2021-06-15 DIAGNOSIS — C64.1 RENAL CELL ADENOCARCINOMA, RIGHT (HCC): ICD-10-CM

## 2021-06-15 DIAGNOSIS — N40.1 BENIGN PROSTATIC HYPERPLASIA WITH LOWER URINARY TRACT SYMPTOMS, SYMPTOM DETAILS UNSPECIFIED: ICD-10-CM

## 2021-06-15 DIAGNOSIS — Z85.528 PERSONAL HISTORY OF OTHER MALIGNANT NEOPLASM OF KIDNEY: Primary | ICD-10-CM

## 2021-06-15 DIAGNOSIS — R97.20 ELEVATED PSA: ICD-10-CM

## 2021-06-15 PROCEDURE — 99214 OFFICE O/P EST MOD 30 MIN: CPT | Performed by: UROLOGY

## 2021-06-15 NOTE — LETTER
Anita 15, 2021     [facility] [address] [place] [address]    Patient: Dmitry Tidwell   YOB: 1960   Date of Visit: 6/15/2021       Dear Dr Hall Shows:    Thank you for referring Joe Woods to me for evaluation  Below are my notes for this consultation  If you have questions, please do not hesitate to call me  I look forward to following your patient along with you  Sincerely,        Cirilo Shultz MD        CC: No Recipients  Cirilo Shultz MD  6/15/2021 10:31 AM  Sign when Signing Visit  Referring Physician: No primary care provider on file  A copy of this note was sent to the referring physician  Diagnoses and all orders for this visit:    Personal history of other malignant neoplasm of kidney    Benign prostatic hyperplasia with lower urinary tract symptoms, symptom details unspecified  -     PSA, Total Screen; Future    Elevated PSA  -     PSA, Total Screen; Future    Renal cell adenocarcinoma, right (HCC)            Assessment and plan:       1  Renal cell carcinoma  -pT1a  -status post radical right nephrectomy (Dr Arnuflo Sellers) (3/2018)    2  Elevated PSA  -status post 2-prostate biopsies, most recently 2016  -Current PSA 8 9, stable from 2019 ( 8 8 at that time)    3  Lower urinary tract symptoms  -currently off of medical management      It was a pleasure to evaluate the patient  His urinary symptoms have worsened to a degree and he has inquired about medical and surgical options for managing his high volume BPH  Today, we discussed a stepwise approach in treating lower urinary tract symptoms associated with BPH  Lower urinary tract symptoms (LUTS) can be sub-divided into those that result from failure of the bladder to store urine normally ("storage symptoms"), those that result from difficulties in emptying ("voiding symptoms"), or those that follow micturition ("post-micturition symptoms")       Obstructive symptoms such as hesitancy, weak stream, and pushing to urinate are classified as voiding symptoms  OAB is due to the inability of the bladder to store urine normally  The symptoms of frequency, urgency, nocturia, and urge incontinence are classified as storage symptoms  I discussed the mainstays of therapy for voiding symptoms including alpha blockers, 5-alpha reductase inhibitors, and surgical management including TURP (laser, monopolar, bipolar, button electrode), TUIP, and prostatectomy  I had a candid discussion about the risks of each of these medications and the mechanism of action  I also discussed the use of PD5 inhibitors for LUTS  Ultimately he elected to continue with watchful waiting without any medical therapy at the present time  He understands a cystoscopy and transrectal ultrasound could be pursued at some point as well  He will follow up annually with advanced practitioner team with a PSA prior or sooner if ever needed  He requires no further radiographic surveillance for his stage I kidney cancer status post resection in 2018    Jack Plummer MD      Chief Complaint      BPH, kidney cancer follow-up      History of Present Illness     Zeb Chamorro is a 62 y o  Male returns in follow-up  He has previously managed by my colleague Dr Mark Anthony Castro  In brief patient has a longstanding history of elevated PSA secondary to high volume BPH  He presents with a updated PSA which is stable at 8 9, measuring similar value back in 2019  He was also noted to have stage I kidney cancer having undergone a radical nephrectomy 2018  He was followed for the appropriate surveillance interval with no evidence of recurrent disease  He is generally asymptomatic voiding standpoint  He does feel that he has some mild occasional nocturia and some urinary intermittency        AUA symptom score today is 6 with a bothersome index of 1    Detailed Urologic History     - please refer to HPI    Review of Systems     Review of Systems   Constitutional: Negative for activity change and fatigue  HENT: Negative for congestion  Eyes: Negative for visual disturbance  Respiratory: Negative for shortness of breath and wheezing  Cardiovascular: Negative for chest pain and leg swelling  Gastrointestinal: Negative for abdominal pain  Endocrine: Positive for polyuria  Genitourinary: Positive for dysuria  Negative for flank pain, hematuria and urgency  Musculoskeletal: Negative for back pain  Allergic/Immunologic: Negative for immunocompromised state  Neurological: Negative for dizziness and numbness  Psychiatric/Behavioral: Negative for dysphoric mood  All other systems reviewed and are negative  Allergies     No Known Allergies    Physical Exam     Physical Exam  Constitutional:       General: He is not in acute distress  Appearance: He is well-developed  HENT:      Head: Normocephalic and atraumatic  Cardiovascular:      Comments: Negative lower extremity edema  Pulmonary:      Effort: Pulmonary effort is normal       Breath sounds: Normal breath sounds  Abdominal:      Palpations: Abdomen is soft  Genitourinary:     Comments: 80 g gland no nodules or induration  Musculoskeletal:         General: Normal range of motion  Cervical back: Normal range of motion  Skin:     General: Skin is warm  Neurological:      Mental Status: He is alert and oriented to person, place, and time  Psychiatric:         Behavior: Behavior normal              Vital Signs  There were no vitals filed for this visit        Current Medications       Current Outpatient Medications:     ALPRAZolam (XANAX) 0 5 mg tablet, Take 1 tablet (0 5 mg total) by mouth once for 1 dose, Disp: 1 tablet, Rfl: 0      Active Problems     Patient Active Problem List   Diagnosis    Personal history of other malignant neoplasm of kidney    Benign prostatic hyperplasia with lower urinary tract symptoms Past Medical History     Past Medical History:   Diagnosis Date    Acquired absence of kidney     Acute prostatitis     BPH with obstruction/lower urinary tract symptoms     BPH without obstruction/lower urinary tract symptoms     Elevated PSA     Family history of malignant neoplasm of kidney     Malignant neoplasm of unspecified kidney, except renal pelvis (HCC)     Other microscopic hematuria     Other specified disorders of kidney and ureter     Personal history of other malignant neoplasm of kidney          Surgical History     Past Surgical History:   Procedure Laterality Date    PROSTATE BIOPSY      RADICAL NEPHRECTOMY Right 2016    VASECTOMY           Family History     Family History   Problem Relation Age of Onset    Breast cancer Mother     Aneurysm Father         brain         Social History     Social History     Social History     Tobacco Use   Smoking Status Never Smoker   Smokeless Tobacco Never Used         Pertinent Lab Values     Lab Results   Component Value Date    CREATININE 1 32 (H) 09/10/2019       Lab Results   Component Value Date    PSA 8 0 (H) 06/20/2018       @RESULTRCNT(1H])@      Pertinent Imaging      - n/a    Portions of the record may have been created with voice recognition software   Occasional wrong word or "sound a like" substitutions may have occurred due to the inherent limitations of voice recognition software   Read the chart carefully and recognize, using context, where substitutions have occurred

## 2021-06-15 NOTE — LETTER
Anita 15, 2021     [facility] [address] [place] [address]    Patient: Pedro Gonzalez   YOB: 1960   Date of Visit: 6/15/2021       Dear Dr Carroll Arvizu:    Thank you for referring Anant Villagran to me for evaluation  Below are my notes for this consultation  If you have questions, please do not hesitate to call me  I look forward to following your patient along with you  Sincerely,        Jasmyn Holliday MD        CC: No Recipients  Jasmyn Holliday MD  6/15/2021 10:30 AM  Incomplete  Referring Physician: No primary care provider on file  A copy of this note was sent to the referring physician  Diagnoses and all orders for this visit:    Personal history of other malignant neoplasm of kidney    Benign prostatic hyperplasia with lower urinary tract symptoms, symptom details unspecified  -     PSA, Total Screen; Future    Elevated PSA  -     PSA, Total Screen; Future    Renal cell adenocarcinoma, right (HCC)            Assessment and plan:       1  Renal cell carcinoma  -pT1a  -status post radical right nephrectomy (Dr Trip Mcgill) (3/2018)    2  Elevated PSA  -status post 2-prostate biopsies, most recently 2016  -Current PSA 8 9, stable from 2019 ( 8 8 at that time)    3  Lower urinary tract symptoms  -currently off of medical management      It was a pleasure to evaluate the patient  His urinary symptoms have worsened to a degree and he has inquired about medical and surgical options for managing his high volume BPH  Today, we discussed a stepwise approach in treating lower urinary tract symptoms associated with BPH  Lower urinary tract symptoms (LUTS) can be sub-divided into those that result from failure of the bladder to store urine normally ("storage symptoms"), those that result from difficulties in emptying ("voiding symptoms"), or those that follow micturition ("post-micturition symptoms")       Obstructive symptoms such as hesitancy, weak stream, and pushing to urinate are classified as voiding symptoms  OAB is due to the inability of the bladder to store urine normally  The symptoms of frequency, urgency, nocturia, and urge incontinence are classified as storage symptoms  I discussed the mainstays of therapy for voiding symptoms including alpha blockers, 5-alpha reductase inhibitors, and surgical management including TURP (laser, monopolar, bipolar, button electrode), TUIP, and prostatectomy  I had a candid discussion about the risks of each of these medications and the mechanism of action  I also discussed the use of PD5 inhibitors for LUTS  Ultimately he elected to continue with watchful waiting without any medical therapy at the present time  He understands a cystoscopy and transrectal ultrasound could be pursued at some point as well  He will follow up annually with advanced practitioner team with a PSA prior or sooner if ever needed  He requires no further radiographic surveillance for his stage I kidney cancer status post resection in 2018    Pedro Haro MD      Chief Complaint      BPH, kidney cancer follow-up      History of Present Illness     Kelsi Lynch is a 62 y o  Male returns in follow-up  He has previously managed by my colleague Dr Kathy Johnson  In brief patient has a longstanding history of elevated PSA secondary to high volume BPH  He presents with a updated PSA which is stable at 8 9, measuring similar value back in 2019  He was also noted to have stage I kidney cancer having undergone a radical nephrectomy 2018  He was followed for the appropriate surveillance interval with no evidence of recurrent disease  He is generally asymptomatic voiding standpoint  He does feel that he has some mild occasional nocturia and some urinary intermittency        AUA symptom score today is 6 with a bothersome index of 1    Detailed Urologic History     - please refer to HPI    Review of Systems Review of Systems   Constitutional: Negative for activity change and fatigue  HENT: Negative for congestion  Eyes: Negative for visual disturbance  Respiratory: Negative for shortness of breath and wheezing  Cardiovascular: Negative for chest pain and leg swelling  Gastrointestinal: Negative for abdominal pain  Endocrine: Positive for polyuria  Genitourinary: Positive for dysuria  Negative for flank pain, hematuria and urgency  Musculoskeletal: Negative for back pain  Allergic/Immunologic: Negative for immunocompromised state  Neurological: Negative for dizziness and numbness  Psychiatric/Behavioral: Negative for dysphoric mood  All other systems reviewed and are negative  Allergies     No Known Allergies    Physical Exam     Physical Exam  Constitutional:       General: He is not in acute distress  Appearance: He is well-developed  HENT:      Head: Normocephalic and atraumatic  Cardiovascular:      Comments: Negative lower extremity edema  Pulmonary:      Effort: Pulmonary effort is normal       Breath sounds: Normal breath sounds  Abdominal:      Palpations: Abdomen is soft  Genitourinary:     Comments: 80 g gland no nodules or induration  Musculoskeletal:         General: Normal range of motion  Cervical back: Normal range of motion  Skin:     General: Skin is warm  Neurological:      Mental Status: He is alert and oriented to person, place, and time  Psychiatric:         Behavior: Behavior normal              Vital Signs  There were no vitals filed for this visit        Current Medications       Current Outpatient Medications:     ALPRAZolam (XANAX) 0 5 mg tablet, Take 1 tablet (0 5 mg total) by mouth once for 1 dose, Disp: 1 tablet, Rfl: 0      Active Problems     Patient Active Problem List   Diagnosis    Personal history of other malignant neoplasm of kidney    Benign prostatic hyperplasia with lower urinary tract symptoms         Past Medical History     Past Medical History:   Diagnosis Date    Acquired absence of kidney     Acute prostatitis     BPH with obstruction/lower urinary tract symptoms     BPH without obstruction/lower urinary tract symptoms     Elevated PSA     Family history of malignant neoplasm of kidney     Malignant neoplasm of unspecified kidney, except renal pelvis (HCC)     Other microscopic hematuria     Other specified disorders of kidney and ureter     Personal history of other malignant neoplasm of kidney          Surgical History     Past Surgical History:   Procedure Laterality Date    PROSTATE BIOPSY      RADICAL NEPHRECTOMY Right 2016    VASECTOMY           Family History     Family History   Problem Relation Age of Onset    Breast cancer Mother     Aneurysm Father         brain         Social History     Social History     Social History     Tobacco Use   Smoking Status Never Smoker   Smokeless Tobacco Never Used         Pertinent Lab Values     Lab Results   Component Value Date    CREATININE 1 32 (H) 09/10/2019       Lab Results   Component Value Date    PSA 8 0 (H) 06/20/2018       @RESULTRCNT(1H])@      Pertinent Imaging      - n/a    Portions of the record may have been created with voice recognition software   Occasional wrong word or "sound a like" substitutions may have occurred due to the inherent limitations of voice recognition software   Read the chart carefully and recognize, using context, where substitutions have occurred  Anneliese Mejía MD  6/15/2021 10:10 AM  Sign when Signing Visit  Referring Physician: No primary care provider on file  A copy of this note was sent to the referring physician  {[email or web address]}        Assessment and plan:       1  Renal cell carcinoma  -pT1a  -status post radical right nephrectomy (Dr Ara Daugherty) (3/2018)    2   Elevated PSA  -status post 2-prostate biopsies, most recently 2016  -Current PSA 8 9, stable from 2019 ( 8 8 at that time)    3  Lower urinary tract symptoms  -currently off of medical management  -     Elba Kapoor MD      Chief Complaint     ***      History of Present Illness     Tricia Bragg is a 62 y o  Detailed Urologic History     - please refer to HPI    Review of Systems     Review of Systems          Allergies     No Known Allergies    Physical Exam     Physical Exam        Vital Signs  There were no vitals filed for this visit        Current Medications       Current Outpatient Medications:     ALPRAZolam (XANAX) 0 5 mg tablet, Take 1 tablet (0 5 mg total) by mouth once for 1 dose, Disp: 1 tablet, Rfl: 0      Active Problems     Patient Active Problem List   Diagnosis    Personal history of other malignant neoplasm of kidney    Benign prostatic hyperplasia with lower urinary tract symptoms         Past Medical History     Past Medical History:   Diagnosis Date    Acquired absence of kidney     Acute prostatitis     BPH with obstruction/lower urinary tract symptoms     BPH without obstruction/lower urinary tract symptoms     Elevated PSA     Family history of malignant neoplasm of kidney     Malignant neoplasm of unspecified kidney, except renal pelvis (HCC)     Other microscopic hematuria     Other specified disorders of kidney and ureter     Personal history of other malignant neoplasm of kidney          Surgical History     Past Surgical History:   Procedure Laterality Date    PROSTATE BIOPSY      RADICAL NEPHRECTOMY Right 2016    VASECTOMY           Family History     Family History   Problem Relation Age of Onset    Breast cancer Mother     Aneurysm Father         brain         Social History     Social History     Social History     Tobacco Use   Smoking Status Never Smoker   Smokeless Tobacco Never Used         Pertinent Lab Values     Lab Results   Component Value Date    CREATININE 1 32 (H) 09/10/2019       Lab Results   Component Value Date    PSA 8 0 (H) 06/20/2018 @RESULTRCNT(1H])@      Pertinent Imaging      - n/a    Portions of the record may have been created with voice recognition software   Occasional wrong word or "sound a like" substitutions may have occurred due to the inherent limitations of voice recognition software   Read the chart carefully and recognize, using context, where substitutions have occurred

## 2021-07-15 ENCOUNTER — TELEPHONE (OUTPATIENT)
Dept: GASTROENTEROLOGY | Facility: MEDICAL CENTER | Age: 61
End: 2021-07-15

## 2021-07-15 NOTE — TELEPHONE ENCOUNTER
Patients wife called (on CC) and scheduled appointment for a colonoscopy consult for her   Requested Dr Anay Cardozo at [facility]  Scheduled as requested address provided to patients wife

## 2021-08-09 ENCOUNTER — CONSULT (OUTPATIENT)
Dept: GASTROENTEROLOGY | Facility: AMBULARY SURGERY CENTER | Age: 61
End: 2021-08-09
Payer: COMMERCIAL

## 2021-08-09 VITALS
SYSTOLIC BLOOD PRESSURE: 128 MMHG | WEIGHT: 198.8 LBS | HEIGHT: 73 IN | DIASTOLIC BLOOD PRESSURE: 84 MMHG | BODY MASS INDEX: 26.35 KG/M2

## 2021-08-09 DIAGNOSIS — Z86.010 HISTORY OF COLON POLYPS: Primary | ICD-10-CM

## 2021-08-09 PROBLEM — Z86.0100 HISTORY OF COLON POLYPS: Status: ACTIVE | Noted: 2021-08-09

## 2021-08-09 PROCEDURE — 99203 OFFICE O/P NEW LOW 30 MIN: CPT | Performed by: INTERNAL MEDICINE

## 2021-08-09 RX ORDER — SODIUM, POTASSIUM,MAG SULFATES 17.5-3.13G
2 SOLUTION, RECONSTITUTED, ORAL ORAL SEE ADMIN INSTRUCTIONS
Qty: 177 ML | Refills: 0 | Status: SHIPPED | OUTPATIENT
Start: 2021-08-09 | End: 2021-08-10

## 2021-08-09 NOTE — PROGRESS NOTES
Consultation - [address] [facility]  Bita Sims 1960 male         Chief Complaint:  For colonoscopy    HPI:   52-year-old male with history of renal cell carcinoma status post right nephrectomy and colon polyps was referred for colonoscopy  Last colonoscopy was about 5 years ago  Patient has regular bowel movements and denies any blood or mucus in the stool  Appetite is good and denies any recent weight loss  Denies any abdominal pain, nausea, or vomiting  Has no heartburn or acid reflux  Denies any difficulty swallowing  REVIEW OF SYSTEMS: Review of Systems   Constitutional: Negative for activity change, appetite change, chills, diaphoresis, fatigue, fever and unexpected weight change  HENT: Negative for ear discharge, ear pain, facial swelling, hearing loss, nosebleeds, sore throat, tinnitus and voice change  Eyes: Negative for pain, discharge, redness, itching and visual disturbance  Respiratory: Negative for apnea, cough, chest tightness, shortness of breath and wheezing  Cardiovascular: Negative for chest pain and palpitations  Gastrointestinal:        As noted in HPI   Endocrine: Negative for cold intolerance, heat intolerance and polyuria  Genitourinary: Negative for difficulty urinating, dysuria, flank pain, hematuria and urgency  Musculoskeletal: Negative for arthralgias, back pain, gait problem, joint swelling and myalgias  Skin: Negative for rash and wound  Neurological: Negative for dizziness, tremors, seizures, speech difficulty, light-headedness, numbness and headaches  Hematological: Negative for adenopathy  Does not bruise/bleed easily  Psychiatric/Behavioral: Negative for agitation, behavioral problems and confusion  The patient is not nervous/anxious           Past Medical History:   Diagnosis Date    Acquired absence of kidney     Acute prostatitis     BPH with obstruction/lower urinary tract symptoms     BPH without obstruction/lower urinary tract symptoms     Elevated PSA     Family history of malignant neoplasm of kidney     Malignant neoplasm of unspecified kidney, except renal pelvis (HCC)     Other microscopic hematuria     Other specified disorders of kidney and ureter     Personal history of other malignant neoplasm of kidney       Past Surgical History:   Procedure Laterality Date    PROSTATE BIOPSY      RADICAL NEPHRECTOMY Right 2016    VASECTOMY       Social History     Socioeconomic History    Marital status: /Civil Union     Spouse name: Not on file    Number of children: Not on file    Years of education: Not on file    Highest education level: Not on file   Occupational History    Not on file   Tobacco Use    Smoking status: Never Smoker    Smokeless tobacco: Never Used   Substance and Sexual Activity    Alcohol use: Yes     Alcohol/week: 2 0 standard drinks     Types: 2 Standard drinks or equivalent per week    Drug use: No    Sexual activity: Not on file   Other Topics Concern    Not on file   Social History Narrative    Not on file     Social Determinants of Health     Financial Resource Strain:     Difficulty of Paying Living Expenses:    Food Insecurity:     Worried About Running Out of Food in the Last Year:     [address] in the Last Year:    Transportation Needs:     Lack of Transportation (Medical):      Lack of Transportation (Non-Medical):    Physical Activity:     Days of Exercise per Week:     Minutes of Exercise per Session:    Stress:     Feeling of Stress :    Social Connections:     Frequency of Communication with Friends and Family:     Frequency of Social Gatherings with Friends and Family:     Attends Confucianism Services:     Active Member of Clubs or Organizations:     Attends Club or Organization Meetings:     Marital Status:    Intimate Partner Violence:     Fear of Current or Ex-Partner:     Emotionally Abused:     Physically Abused:     Sexually Abused: Family History   Problem Relation Age of Onset    Breast cancer Mother     Aneurysm Father         brain     Patient has no known allergies  Current Outpatient Medications   Medication Sig Dispense Refill    ALPRAZolam (XANAX) 0 5 mg tablet Take 1 tablet (0 5 mg total) by mouth once for 1 dose 1 tablet 0    Na Sulfate-K Sulfate-Mg Sulf (Suprep Bowel Prep Kit) 17 5-3 13-1 6 GM/177ML SOLN Take 2 Bottles by mouth see administration instructions Please follow the instructions from the office 177 mL 0     No current facility-administered medications for this visit  Blood pressure 128/84, height 6' 1" (1 854 m), weight 90 2 kg (198 lb 12 8 oz)  PHYSICAL EXAM: Physical Exam  Constitutional:       Appearance: He is well-developed  HENT:      Head: Normocephalic and atraumatic  Eyes:      General: No scleral icterus  Right eye: No discharge  Left eye: No discharge  Conjunctiva/sclera: Conjunctivae normal       Pupils: Pupils are equal, round, and reactive to light  Neck:      Thyroid: No thyromegaly  Vascular: No JVD  Trachea: No tracheal deviation  Cardiovascular:      Rate and Rhythm: Normal rate and regular rhythm  Heart sounds: Normal heart sounds  No murmur heard  No friction rub  No gallop  Pulmonary:      Effort: Pulmonary effort is normal  No respiratory distress  Breath sounds: Normal breath sounds  No wheezing or rales  Chest:      Chest wall: No tenderness  Abdominal:      General: Bowel sounds are normal  There is no distension  Palpations: Abdomen is soft  There is no mass  Tenderness: There is no abdominal tenderness  There is no guarding or rebound  Hernia: No hernia is present  Musculoskeletal:      Cervical back: Neck supple  Lymphadenopathy:      Cervical: No cervical adenopathy  Skin:     General: Skin is warm and dry  Findings: No erythema or rash     Neurological:      Mental Status: He is alert and oriented to person, place, and time  Psychiatric:         Behavior: Behavior normal          Thought Content: Thought content normal           No results found for: WBC, HGB, HCT, MCV, PLT  Lab Results   Component Value Date    K 4 3 09/10/2019    CO2 25 09/10/2019     09/10/2019    BUN 19 09/10/2019    CREATININE 1 32 (H) 09/10/2019     Lab Results   Component Value Date    ALT 16 09/10/2019    AST 19 09/10/2019     No results found for: INR, PROTIME    MRI prostate multiparametric wo w contrast    Result Date: 10/12/2020  Impression: 1  PI-RADSv2 1 Category 2 - Low (clinically significant cancer is unlikely to be present)  2   Marked BPH  Calculated prostate volume of 121 cc  3  Incidentally noted 7 mm cystic lesion in the right scrotal sac, possibly an epididymal head cyst   This can be further evaluated with a scrotal ultrasound if clinically warranted  Workstation performed: [id number]       ASSESSMENT & PLAN:    History of colon polyps   Personal history of colon polyps- patient is at increased risk for colon cancer screening  Rule out colorectal lesions including polyps or malignancy       -Schedule for colonoscopy  -High-fiber diet     -Patient was given instructions about the colonoscopy prep     -Patient was explained about  the risks and benefits of the procedure  Risks including but not limited to bleeding, infection, perforation were explained in detail  Also explained about less than 100% sensitivity with the exam and other alternatives

## 2021-08-10 ENCOUNTER — TELEPHONE (OUTPATIENT)
Dept: GASTROENTEROLOGY | Facility: AMBULARY SURGERY CENTER | Age: 61
End: 2021-08-10

## 2021-08-10 DIAGNOSIS — Z86.010 HISTORY OF COLON POLYPS: Primary | ICD-10-CM

## 2021-08-10 RX ORDER — POLYETHYLENE GLYCOL 3350 17 G/17G
POWDER, FOR SOLUTION ORAL
Qty: 238 G | Refills: 0 | Status: SHIPPED | OUTPATIENT
Start: 2021-08-10 | End: 2021-11-12 | Stop reason: ALTCHOICE

## 2021-11-11 ENCOUNTER — TELEPHONE (OUTPATIENT)
Dept: GASTROENTEROLOGY | Facility: AMBULARY SURGERY CENTER | Age: 61
End: 2021-11-11

## 2021-11-12 ENCOUNTER — ANESTHESIA EVENT (OUTPATIENT)
Dept: GASTROENTEROLOGY | Facility: AMBULARY SURGERY CENTER | Age: 61
End: 2021-11-12

## 2021-11-12 ENCOUNTER — ANESTHESIA (OUTPATIENT)
Dept: GASTROENTEROLOGY | Facility: AMBULARY SURGERY CENTER | Age: 61
End: 2021-11-12

## 2021-11-12 ENCOUNTER — HOSPITAL ENCOUNTER (OUTPATIENT)
Dept: GASTROENTEROLOGY | Facility: AMBULARY SURGERY CENTER | Age: 61
Setting detail: OUTPATIENT SURGERY
Discharge: HOME | End: 2021-11-12
Attending: INTERNAL MEDICINE
Payer: COMMERCIAL

## 2021-11-12 VITALS
SYSTOLIC BLOOD PRESSURE: 123 MMHG | HEART RATE: 66 BPM | TEMPERATURE: 97.2 F | RESPIRATION RATE: 16 BRPM | OXYGEN SATURATION: 99 % | DIASTOLIC BLOOD PRESSURE: 78 MMHG

## 2021-11-12 DIAGNOSIS — Z86.010 HISTORY OF COLON POLYPS: ICD-10-CM

## 2021-11-12 PROBLEM — K63.5 COLON POLYP: Status: ACTIVE | Noted: 2021-11-12

## 2021-11-12 PROCEDURE — 88305 TISSUE EXAM BY PATHOLOGIST: CPT | Performed by: PATHOLOGY

## 2021-11-12 PROCEDURE — G0105 COLORECTAL SCRN; HI RISK IND: HCPCS | Performed by: INTERNAL MEDICINE

## 2021-11-12 RX ORDER — PROPOFOL 10 MG/ML
INJECTION, EMULSION INTRAVENOUS CONTINUOUS PRN
Status: DISCONTINUED | OUTPATIENT
Start: 2021-11-12 | End: 2021-11-12

## 2021-11-12 RX ORDER — LIDOCAINE HYDROCHLORIDE 10 MG/ML
INJECTION, SOLUTION EPIDURAL; INFILTRATION; INTRACAUDAL; PERINEURAL AS NEEDED
Status: DISCONTINUED | OUTPATIENT
Start: 2021-11-12 | End: 2021-11-12

## 2021-11-12 RX ORDER — PROPOFOL 10 MG/ML
INJECTION, EMULSION INTRAVENOUS AS NEEDED
Status: DISCONTINUED | OUTPATIENT
Start: 2021-11-12 | End: 2021-11-12

## 2021-11-12 RX ORDER — SODIUM CHLORIDE, SODIUM LACTATE, POTASSIUM CHLORIDE, CALCIUM CHLORIDE 600; 310; 30; 20 MG/100ML; MG/100ML; MG/100ML; MG/100ML
INJECTION, SOLUTION INTRAVENOUS CONTINUOUS PRN
Status: DISCONTINUED | OUTPATIENT
Start: 2021-11-12 | End: 2021-11-12

## 2021-11-12 RX ADMIN — PROPOFOL 150 MG: 10 INJECTION, EMULSION INTRAVENOUS at 10:06

## 2021-11-12 RX ADMIN — SODIUM CHLORIDE, SODIUM LACTATE, POTASSIUM CHLORIDE, AND CALCIUM CHLORIDE: .6; .31; .03; .02 INJECTION, SOLUTION INTRAVENOUS at 09:35

## 2021-11-12 RX ADMIN — PROPOFOL 100 MCG/KG/MIN: 10 INJECTION, EMULSION INTRAVENOUS at 10:08

## 2021-11-12 RX ADMIN — PROPOFOL 50 MG: 10 INJECTION, EMULSION INTRAVENOUS at 10:08

## 2021-11-12 RX ADMIN — LIDOCAINE HYDROCHLORIDE 50 MG: 10 INJECTION, SOLUTION EPIDURAL; INFILTRATION; INTRACAUDAL at 10:06

## 2021-12-09 ENCOUNTER — TELEPHONE (OUTPATIENT)
Dept: FAMILY MEDICINE CLINIC | Facility: CLINIC | Age: 61
End: 2021-12-09

## 2021-12-09 NOTE — TELEPHONE ENCOUNTER
There are no visits in [facility] or Epic for patient  Could Dr Daily Portillo be removed as patient's PCP?

## 2022-01-24 ENCOUNTER — TELEPHONE (OUTPATIENT)
Dept: GASTROENTEROLOGY | Facility: AMBULARY SURGERY CENTER | Age: 62
End: 2022-01-24

## 2022-01-24 NOTE — TELEPHONE ENCOUNTER
Patients GI provider:  Dr Peg Carrasco     Number to return call: [phone]     Reason for call: Pt calling requesting to speak with someone regarding medical coding error       Scheduled procedure/appointment date if applicable: Apt/procedure n/a

## 2022-01-25 NOTE — PROGRESS NOTES
Cardiology Consultation     Authur Sever  [id number]  1960  [facility] [place]  [id number] [facility] [id number]      1  Palpitations  POCT ECG    Echo complete w/ contrast if indicated    Comprehensive metabolic panel    Lipid Panel With Direct LDL    TSH, 3rd generation with Free T4 reflex       Discussion/Summary:  Mr Radha Wong is a pleasant 59-year-old gentleman who presents to the office today for the evaluation of palpitations  By description they sound as if they represent some form of SVT  They have a sudden onset and offset and can be terminated with breath holding  They occur every few months  We talked about further diagnostic options  Given the infrequent nature of his symptoms ambulatory rhythm monitoring may be low yield  We discussed implantation of a loop recorder  We also discussed continued observation  For now he prefers continued observation  I have asked him to keep a diary of his symptoms  If they become more frequent monitoring can be pursued  Otherwise I have asked for an echocardiogram to rule out any underlying structural heart disease  Basic blood work including a CMP and TSH have also been requested  His blood pressure is well controlled in the office today on no medication  I have no recent assessment of his lipids and a prescription was provided  Follow-up will be determined based on the above-noted testing and further symptoms  History of Present Illness:  Mr Radha Wong is a 59-year-old gentleman who presents to the office today for the evaluation of palpitations  He states for at least a decade he has had palpitations described as a sudden onset and offset of a rapid heartbeat  They occur every few months  His last episode was probably a month ago  He holds his breath in the symptoms resolve  They last about a minute    They are not associated with any other symptoms except some "fuzziness" in his head  He has not been participating in any formal physical activity  With the activity he does perform he is asymptomatic  He denies any exertional chest pain or shortness of breath  He denies any signs or symptoms of congestive heart failure including lower extremity edema, paroxysmal nocturnal dyspnea, orthopnea, acute weight gain or increasing abdominal girth  He denies syncope or presyncope  He denies symptoms of claudication  Patient Active Problem List   Diagnosis    Personal history of other malignant neoplasm of kidney    Benign prostatic hyperplasia with lower urinary tract symptoms    Renal cell adenocarcinoma, right (HCC)    History of colon polyps    Colon polyp     Past Medical History:   Diagnosis Date    Acquired absence of kidney     Acute prostatitis     BPH with obstruction/lower urinary tract symptoms     BPH without obstruction/lower urinary tract symptoms     Colon polyp     Elevated PSA     Family history of malignant neoplasm of kidney     Malignant neoplasm of unspecified kidney, except renal pelvis ([address] )     Other microscopic hematuria     Other specified disorders of kidney and ureter     Personal history of other malignant neoplasm of kidney      Social History     Socioeconomic History    Marital status: /Civil Union     Spouse name: Not on file    Number of children: Not on file    Years of education: Not on file    Highest education level: Not on file   Occupational History    Not on file   Tobacco Use    Smoking status: Never Smoker    Smokeless tobacco: Never Used   Vaping Use    Vaping Use: Never used   Substance and Sexual Activity    Alcohol use:  Yes     Alcohol/week: 2 0 standard drinks     Types: 2 Standard drinks or equivalent per week     Comment: socially     Drug use: No    Sexual activity: Not on file   Other Topics Concern    Not on file   Social History Narrative    Not on file     Social Determinants of Health     Financial Resource Strain: Not on file   Food Insecurity: Not on file   Transportation Needs: Not on file   Physical Activity: Not on file   Stress: Not on file   Social Connections: Not on file   Intimate Partner Violence: Not on file   Housing Stability: Not on file      Family History   Problem Relation Age of Onset    Breast cancer Mother     Heart failure Mother     Atrial fibrillation Mother     Aneurysm Father         brain     Past Surgical History:   Procedure Laterality Date    COLONOSCOPY      PROSTATE BIOPSY      RADICAL NEPHRECTOMY Right 2016    VASECTOMY         Current Outpatient Medications:     ALPRAZolam (XANAX) 0 5 mg tablet, Take 1 tablet (0 5 mg total) by mouth once for 1 dose (Patient not taking: Reported on 1/26/2022 ), Disp: 1 tablet, Rfl: 0  No Known Allergies        Imaging: No results found  ECG:  Normal sinus rhythm, normal ECG    Review of Systems:  Review of Systems   Respiratory: Negative for choking and shortness of breath  Cardiovascular: Positive for palpitations  Negative for chest pain and leg swelling  All other systems reviewed and are negative          Vitals:    01/26/22 0813 01/26/22 0835   BP:  122/62   Weight: 92 1 kg (203 lb)    Height: 6' 1" (1 854 m)      Vitals:    01/26/22 0813   Weight: 92 1 kg (203 lb)     Height: 6' 1" (185 4 cm)     Physical Exam:  General appearance:  Appears stated age, alert, well appearing and in no distress  HEENT:  PERRLA, EOMI, no scleral icterus, no conjunctival pallor  NECK:  Supple, No elevated JVP, no thyromegaly, no carotid bruits  HEART:  Regular rate and rhythm, normal S1/S2, no S3/S4, no murmur or rub  LUNGS:  Clear to auscultation bilaterally  ABDOMEN:  Soft, non-tender, positive bowel sounds, no rebound or guarding, no organomegaly   EXTREMITIES:  No edema  VASCULAR:  Normal pedal pulses   SKIN: No lesions or rashes on exposed skin  NEURO:  CN II-XII intact, no focal deficits

## 2022-01-26 ENCOUNTER — OFFICE VISIT (OUTPATIENT)
Dept: CARDIOLOGY CLINIC | Facility: CLINIC | Age: 62
End: 2022-01-26
Payer: COMMERCIAL

## 2022-01-26 VITALS
HEIGHT: 73 IN | SYSTOLIC BLOOD PRESSURE: 122 MMHG | DIASTOLIC BLOOD PRESSURE: 62 MMHG | BODY MASS INDEX: 26.9 KG/M2 | WEIGHT: 203 LBS

## 2022-01-26 DIAGNOSIS — R00.2 PALPITATIONS: Primary | ICD-10-CM

## 2022-01-26 PROCEDURE — 93000 ELECTROCARDIOGRAM COMPLETE: CPT | Performed by: INTERNAL MEDICINE

## 2022-01-26 PROCEDURE — 99204 OFFICE O/P NEW MOD 45 MIN: CPT | Performed by: INTERNAL MEDICINE

## 2022-01-31 NOTE — TELEPHONE ENCOUNTER
Pt calling again in regards to medical coding error and asking to speak with someone regarding this matter  Pt can be reached at [phone]

## 2022-02-02 NOTE — TELEPHONE ENCOUNTER
02/02/22 8:14 AM     Thank you for your request    PCP to be removed at office level for this scenario - if patient not seen by any provider at the office (in [place] or [address] [place] [address]) and provider/office not listed on insurance card on file (or if no insurance card on file, can also remove)  Please add comment when removing PCP  This message will now be completed      Thank you  Nidhi Dennison

## 2022-02-14 NOTE — TELEPHONE ENCOUNTER
Contacted billing spoke with Sarthak Nunez asked her to resubmit date of service 8/9/2021 with new patient code [id number]  She said she will send it to billing department to have it resubmitted to insurance company

## 2022-03-15 ENCOUNTER — HOSPITAL ENCOUNTER (OUTPATIENT)
Dept: NON INVASIVE DIAGNOSTICS | Facility: CLINIC | Age: 62
Discharge: HOME | End: 2022-03-15
Payer: COMMERCIAL

## 2022-03-15 ENCOUNTER — APPOINTMENT (OUTPATIENT)
Dept: LAB | Facility: CLINIC | Age: 62
End: 2022-03-15
Payer: COMMERCIAL

## 2022-03-15 ENCOUNTER — TELEPHONE (OUTPATIENT)
Dept: UROLOGY | Facility: CLINIC | Age: 62
End: 2022-03-15

## 2022-03-15 VITALS
SYSTOLIC BLOOD PRESSURE: 122 MMHG | BODY MASS INDEX: 26.9 KG/M2 | HEART RATE: 66 BPM | WEIGHT: 203 LBS | HEIGHT: 73 IN | DIASTOLIC BLOOD PRESSURE: 62 MMHG

## 2022-03-15 DIAGNOSIS — R00.2 PALPITATIONS: ICD-10-CM

## 2022-03-15 DIAGNOSIS — R97.20 ELEVATED PSA: Primary | ICD-10-CM

## 2022-03-15 DIAGNOSIS — N40.1 BENIGN PROSTATIC HYPERPLASIA WITH LOWER URINARY TRACT SYMPTOMS, SYMPTOM DETAILS UNSPECIFIED: ICD-10-CM

## 2022-03-15 DIAGNOSIS — R97.20 ELEVATED PSA: ICD-10-CM

## 2022-03-15 LAB
ALBUMIN SERPL BCP-MCNC: 3.7 G/DL (ref 3.5–5)
ALP SERPL-CCNC: 66 U/L (ref 46–116)
ALT SERPL W P-5'-P-CCNC: 34 U/L (ref 12–78)
ANION GAP SERPL CALCULATED.3IONS-SCNC: 7 MMOL/L (ref 4–13)
AORTIC ROOT: 3.8 CM
APICAL FOUR CHAMBER EJECTION FRACTION: 50 %
ASCENDING AORTA: 3.1 CM (ref 2.12–3.18)
AST SERPL W P-5'-P-CCNC: 22 U/L (ref 5–45)
AV LVOT PEAK GRADIENT: 1 MMHG
AV PEAK GRADIENT: 3 MMHG
BILIRUB SERPL-MCNC: 0.46 MG/DL (ref 0.2–1)
BUN SERPL-MCNC: 15 MG/DL (ref 5–25)
CALCIUM SERPL-MCNC: 9.1 MG/DL (ref 8.3–10.1)
CHLORIDE SERPL-SCNC: 107 MMOL/L (ref 100–108)
CHOLEST SERPL-MCNC: 248 MG/DL
CO2 SERPL-SCNC: 27 MMOL/L (ref 21–32)
CREAT SERPL-MCNC: 1.3 MG/DL (ref 0.6–1.3)
DOP CALC RVOT PEAK VEL: 0.44 M/S
E WAVE DECELERATION TIME: 227 MS
FRACTIONAL SHORTENING: 30 % (ref 28–44)
GFR SERPL CREATININE-BSD FRML MDRD: 59 ML/MIN/1.73SQ M
GLUCOSE P FAST SERPL-MCNC: 94 MG/DL (ref 65–99)
HDLC SERPL-MCNC: 54 MG/DL
INTERVENTRICULAR SEPTUM IN DIASTOLE (PARASTERNAL SHORT AXIS VIEW): 0.9 CM
INTERVENTRICULAR SEPTUM: 0.9 CM (ref 0.55–1.03)
LDLC SERPL CALC-MCNC: 172 MG/DL (ref 0–100)
LDLC SERPL DIRECT ASSAY-MCNC: 165 MG/DL (ref 0–100)
LEFT ATRIUM AREA SYSTOLE SINGLE PLANE A4C: 18.5 CM2
LEFT ATRIUM SIZE: 3.8 CM
LEFT INTERNAL DIMENSION IN SYSTOLE: 3.5 CM (ref 3.66–5.54)
LEFT VENTRICLE DIASTOLIC VOLUME (MOD BIPLANE): 66 ML (ref 105.68–237.99)
LEFT VENTRICLE SYSTOLIC VOLUME (MOD BIPLANE): 34 ML
LEFT VENTRICULAR INTERNAL DIMENSION IN DIASTOLE: 5 CM (ref 6.06–9.03)
LEFT VENTRICULAR POSTERIOR WALL IN END DIASTOLE: 0.9 CM (ref 0.54–1.02)
LEFT VENTRICULAR STROKE VOLUME: 66 ML
LV EF: 49 %
LVSV (TEICH): 66 ML
MV E'TISSUE VEL-SEP: 8 CM/S
MV PEAK A VEL: 0.35 M/S
MV PEAK E VEL: 59 CM/S
MV STENOSIS PRESSURE HALF TIME: 66 MS
MV VALVE AREA P 1/2 METHOD: 3.33 CM2
NONHDLC SERPL-MCNC: 194 MG/DL
POTASSIUM SERPL-SCNC: 4.4 MMOL/L (ref 3.5–5.3)
PROT SERPL-MCNC: 7.6 G/DL (ref 6.4–8.2)
PSA SERPL-MCNC: 10.9 NG/ML (ref 0–4)
PV PEAK GRADIENT: 3 MMHG
RIGHT ATRIUM AREA SYSTOLE A4C: 15 CM2
RIGHT VENTRICLE ID DIMENSION: 3.7 CM
SL CV LV DIAS VOL ENDO Z SCORE: -3.2
SL CV LV EF: 55
SL CV PED ECHO LEFT VENTRICLE DIASTOLIC VOLUME (MOD BIPLANE) 2D: 118 ML
SL CV PED ECHO LEFT VENTRICLE SYSTOLIC VOLUME (MOD BIPLANE) 2D: 52 ML
SL CV RVOT MAX GRADIENT: 1 MMHG
SODIUM SERPL-SCNC: 141 MMOL/L (ref 136–145)
TR MAX PG: 22 MMHG
TR PEAK VELOCITY: 2.4 M/S
TRICUSPID VALVE PEAK REGURGITATION VELOCITY: 2.37 M/S
TRIGL SERPL-MCNC: 112 MG/DL
TSH SERPL DL<=0.05 MIU/L-ACNC: 1.83 UIU/ML (ref 0.36–3.74)
Z-SCORE OF ASCENDING AORTA: 1.69
Z-SCORE OF INTERVENTRICULAR SEPTUM IN END DIASTOLE: 0.86
Z-SCORE OF LEFT VENTRICULAR DIMENSION IN END DIASTOLE: -3.92
Z-SCORE OF LEFT VENTRICULAR DIMENSION IN END SYSTOLE: -1.98
Z-SCORE OF LEFT VENTRICULAR POSTERIOR WALL IN END DIASTOLE: 0.98

## 2022-03-15 PROCEDURE — 93306 TTE W/DOPPLER COMPLETE: CPT | Performed by: INTERNAL MEDICINE

## 2022-03-15 PROCEDURE — 80061 LIPID PANEL: CPT

## 2022-03-15 PROCEDURE — 36415 COLL VENOUS BLD VENIPUNCTURE: CPT | Performed by: INTERNAL MEDICINE

## 2022-03-15 PROCEDURE — 83721 ASSAY OF BLOOD LIPOPROTEIN: CPT

## 2022-03-15 PROCEDURE — G0103 PSA SCREENING: HCPCS

## 2022-03-15 PROCEDURE — 84443 ASSAY THYROID STIM HORMONE: CPT | Performed by: INTERNAL MEDICINE

## 2022-03-15 PROCEDURE — 80053 COMPREHEN METABOLIC PANEL: CPT | Performed by: INTERNAL MEDICINE

## 2022-03-15 PROCEDURE — 93306 TTE W/DOPPLER COMPLETE: CPT

## 2022-03-15 NOTE — TELEPHONE ENCOUNTER
Established patient  PSA has risen further      I recommended he be scheduled for a follow-up visit with advanced practitioner for further assessment

## 2022-03-15 NOTE — TELEPHONE ENCOUNTER
Called and let patient know about PSA score and that dr Miranda Randolph would like to have him brought in    I scheduled him on Thursday and he said he just needs to verify his work schedule when he get home and will call or message us back to confirm

## 2022-03-16 ENCOUNTER — TELEPHONE (OUTPATIENT)
Dept: UROLOGY | Facility: HOSPITAL | Age: 62
End: 2022-03-16

## 2022-03-16 NOTE — TELEPHONE ENCOUNTER
Patient calling to cancel OV 3/17 in [place] office to to work conflict    LM to attempt to r/s/ please follow up to r/s   [phone]

## 2022-03-16 NOTE — TELEPHONE ENCOUNTER
Patient called in to cancel 3/17 appt because of work conflict  Patient asked if he can get another psa test in 3 months because he stated his numbers go up and down and he wanted to wait to see if the number lowers and if not he would be willing to come for a office visit  Patient declined to reschedule at this time

## 2022-10-27 ENCOUNTER — PROBLEM (OUTPATIENT)
Dept: URBAN - METROPOLITAN AREA CLINIC 6 | Facility: CLINIC | Age: 62
End: 2022-10-27

## 2022-10-27 DIAGNOSIS — D23.122: ICD-10-CM

## 2022-10-27 PROCEDURE — 92012 INTRM OPH EXAM EST PATIENT: CPT

## 2022-10-27 ASSESSMENT — VISUAL ACUITY
OS_CC: 20/20
OD_CC: 20/20

## 2022-10-27 ASSESSMENT — TONOMETRY
OS_IOP_MMHG: 14
OD_IOP_MMHG: 11

## 2022-11-23 ENCOUNTER — PROCEDURE ONLY (OUTPATIENT)
Dept: URBAN - METROPOLITAN AREA CLINIC 6 | Facility: CLINIC | Age: 62
End: 2022-11-23

## 2022-11-23 DIAGNOSIS — D23.122: ICD-10-CM

## 2022-11-23 PROCEDURE — 67840 REMOVE EYELID LESION: CPT | Mod: E2

## 2022-11-23 ASSESSMENT — TONOMETRY
OD_IOP_MMHG: 12
OS_IOP_MMHG: 14

## 2022-11-23 ASSESSMENT — VISUAL ACUITY
OD_CC: 20/25
OS_CC: 20/20

## 2023-03-06 ENCOUNTER — OFFICE VISIT (OUTPATIENT)
Dept: UROLOGY | Facility: CLINIC | Age: 63
End: 2023-03-06

## 2023-03-06 ENCOUNTER — TELEPHONE (OUTPATIENT)
Dept: OTHER | Facility: OTHER | Age: 63
End: 2023-03-06

## 2023-03-06 VITALS
HEIGHT: 73 IN | WEIGHT: 208 LBS | BODY MASS INDEX: 27.57 KG/M2 | DIASTOLIC BLOOD PRESSURE: 78 MMHG | SYSTOLIC BLOOD PRESSURE: 112 MMHG

## 2023-03-06 DIAGNOSIS — R97.20 ELEVATED PSA: Primary | ICD-10-CM

## 2023-03-06 NOTE — TELEPHONE ENCOUNTER
Pt would like to know if he has to get blood work done again for his MRI   Pt recently got some done and says it's good for 30 days     Please give him a call back

## 2023-03-07 DIAGNOSIS — R97.20 ELEVATED PSA: Primary | ICD-10-CM

## 2023-03-07 RX ORDER — LORAZEPAM 1 MG/1
1 TABLET ORAL EVERY 8 HOURS PRN
Qty: 2 TABLET | Refills: 0 | Status: SHIPPED | OUTPATIENT
Start: 2023-03-07 | End: 2023-08-23

## 2023-03-07 NOTE — PROGRESS NOTES
UROLOGY PROGRESS NOTE   Patient Identifiers: Hung Lamb (MRN [id number])  Date of Service: 3/7/2023    Subjective:   61-year-old man with history of right renal cell cancer  He had a radical nephrectomy by Dr Elizabeth Benito 2018  Is no recent imaging  He is also has a history of elevated PSA and has had 2 prostate biopsies most recently in 2016 which was benign  His last PSA was 8 9  Current PSA 13 9  Free percent 34 8  Last multiparametric MRI in 2020 was PI-RADS 2  Prostate size was 121 cc  He has some nocturia 1-2 times per night  Otherwise no significant lower tract complaints  Reason for visit: Elevated PSA follow-up    Objective:     VITALS:    Vitals:    03/06/23 1341   BP: 112/78     AUA SYMPTOM SCORE    Flowsheet Row Most Recent Value   AUA SYMPTOM SCORE    How often have you had a sensation of not emptying your bladder completely after you finished urinating? 0 (P)     How often have you had to urinate again less than two hours after you finished urinating? 1 (P)     How often have you found you stopped and started again several times when you urinate? 0 (P)     How often have you found it difficult to postpone urination? 0 (P)     How often have you had a weak urinary stream? 3 (P)     How often have you had to push or strain to begin urination? 0 (P)     How many times did you most typically get up to urinate from the time you went to bed at night until the time you got up in the morning? 5 (P)     Quality of Life: If you were to spend the rest of your life with your urinary condition just the way it is now, how would you feel about that? 1 (P)     AUA SYMPTOM SCORE 9 (P)             LABS:  No results found for: HGB, HCT, WBC, PLT]    Lab Results   Component Value Date    K 4 4 03/15/2022     03/15/2022    CO2 27 03/15/2022    BUN 15 03/15/2022    CREATININE 1 30 03/15/2022    CALCIUM 9 1 03/15/2022   ]        INPATIENT MEDS:  No current outpatient medications on file        Physical Exam:   /78 (BP Location: Left arm, Patient Position: Sitting, Cuff Size: Adult)   Ht 6' 1" (1 854 m)   Wt 94 3 kg (208 lb)   BMI 27 44 kg/m²   GEN: no acute distress    RESP: breathing comfortably with no accessory muscle use    ABD: soft, non-tender, non-distended   INCISION:    EXT: no significant peripheral edema   (Male): Penis circumcised, phallus normal, meatus patent  Testicles descended into scrotum bilaterally without masses nor tenderness  No inguinal hernias bilaterally  INDIA: Prostate is enlarged at 80+ grams  The prostate is not boggy  The prostate is not tender  No nodules noted      RADIOLOGY:   None    Assessment:   #1  Elevated PSA  #2    History of right renal cell cancer    Plan:   -We will schedule him for a multiparametric MRI  -I explained the potential results 1 through 5  -I will call him if he has a PI-RADS 3, 4 or 5 and discussed prostate biopsy  -Otherwise I will see him in 6 months with PSA prior to visit

## 2023-03-07 NOTE — TELEPHONE ENCOUNTER
Advised patient that it looks like it would work for me but recommended he call radiology and let them know results can be found scanned in and they will be able to tell him if it will be ok  Patient asked if providers can send him a one time dose for something due to claustrophobia in MRIs   Advised I would ask providers then send him a message via [phone] and let him know

## 2023-03-23 ENCOUNTER — HOSPITAL ENCOUNTER (OUTPATIENT)
Dept: RADIOLOGY | Age: 63
Discharge: HOME | End: 2023-03-23

## 2023-03-23 DIAGNOSIS — R97.20 ELEVATED PSA: ICD-10-CM

## 2023-03-23 RX ADMIN — GADOBUTROL 9 ML: 604.72 INJECTION INTRAVENOUS at 08:30

## 2023-08-23 ENCOUNTER — OFFICE VISIT (OUTPATIENT)
Dept: FAMILY MEDICINE CLINIC | Facility: CLINIC | Age: 63
End: 2023-08-23
Payer: COMMERCIAL

## 2023-08-23 VITALS
RESPIRATION RATE: 16 BRPM | WEIGHT: 202 LBS | OXYGEN SATURATION: 98 % | HEIGHT: 73 IN | DIASTOLIC BLOOD PRESSURE: 78 MMHG | TEMPERATURE: 98.6 F | SYSTOLIC BLOOD PRESSURE: 118 MMHG | BODY MASS INDEX: 26.77 KG/M2 | HEART RATE: 72 BPM

## 2023-08-23 DIAGNOSIS — M72.2 PLANTAR FASCIITIS: Primary | ICD-10-CM

## 2023-08-23 PROCEDURE — 99213 OFFICE O/P EST LOW 20 MIN: CPT | Performed by: FAMILY MEDICINE

## 2023-08-23 RX ORDER — NAPROXEN 500 MG/1
500 TABLET ORAL 2 TIMES DAILY WITH MEALS
Qty: 20 TABLET | Refills: 0 | Status: SHIPPED | OUTPATIENT
Start: 2023-08-23

## 2023-08-23 NOTE — PATIENT INSTRUCTIONS
Plantar Fasciitis Exercises   WHAT YOU NEED TO KNOW:   Plantar fasciitis exercises help stretch your plantar fascia, calf muscles, and Achilles tendon. They also help strengthen the muscles that support your heel and foot. Exercises and stretching can help prevent plantar fasciitis from getting worse or coming back. DISCHARGE INSTRUCTIONS:   Call your doctor or physical therapist if:   Your pain and swelling increase. You develop new knee, hip, or back pain. You have questions or concerns about your condition or care. How to do plantar fasciitis exercises:  Ask your healthcare provider when to start these exercises and how often to do them. Slant board stretch:  Stand on a slanted board with your toes higher than your heel. Press your heel into the board. Keep your knee slightly bent. Hold this position for 1 minute. Repeat 5 times. Heel stretch:  Stand up straight with your hands on a wall. Place your injured leg slightly behind your other leg. Keep your heels flat on the floor, lean forward, and bend both knees. Hold for 30 seconds. Calf stretch:  Stand up straight with your hands on a wall. Step forward so that your uninjured foot is in front of your injured foot. Keep your front leg bent and your back leg straight. Gently lean forward until you feel your calf stretch. Hold for 30 seconds and then relax. Seated plantar fascia stretch:  Sit on a firm surface, such as the floor or a mat. Extend your legs out in front of you. Raise your injured foot a few inches off the ground. Keep your leg straight. Grab the toes of your injured foot and pull them toward you. With your other hand, feel your plantar fascia. You should feel it press outward. Hold for 30 seconds. If you cannot reach your toes, loop a towel or tie around your foot. Gently pull on the towel or tie and flex your toes toward you. Heel raises:  Stand on the injured leg. Raise your other leg off the ground. Hold onto a railing or wall for balance. Slowly rise up on the toes of your injured leg. Hold for 5 seconds. Slowly lower your heel to the ground. Toe curls:  Place a towel on the floor. Put your foot flat on the towel. Grab the towel with your toes by curling them around the towel. Lift the towel up with your toes. Toe taps:  Sit down and place your foot flat on the floor. Keep your heel on the floor. Point all your toes up toward the ceiling. While the 4 smaller toes are pointed up, bend your big toe down and tap it on the ground. Do 10 to 50 taps. Point all 5 toes up toward the ceiling again. This time keep your big toe pointed up and tap the 4 smaller toes on the ground. Do 10 to 50 taps each time. Follow up with your doctor or physical therapist as directed:  Write down your questions so you remember to ask them during your visits. © Copyright [employer] 2022 Information is for End User's use only and may not be sold, redistributed or otherwise used for commercial purposes. The above information is an  only. It is not intended as medical advice for individual conditions or treatments. Talk to your doctor, nurse or pharmacist before following any medical regimen to see if it is safe and effective for you.

## 2023-08-23 NOTE — PROGRESS NOTES
BMI Counseling: Body mass index is 26.65 kg/m². The BMI is above normal. Nutrition recommendations include decreasing portion sizes, encouraging healthy choices of fruits and vegetables, consuming healthier snacks and moderation in carbohydrate intake. Exercise recommendations include exercising 3-5 times per week. No pharmacotherapy was ordered. Rationale for BMI follow-up plan is due to patient being overweight or obese. Depression Screening and Follow-up Plan: Patient was screened for depression during today's encounter. They screened negative with a PHQ-2 score of 0. Assessment/Plan:         Problem List Items Addressed This Visit        Musculoskeletal and Integument    Plantar fasciitis - Primary     Patient has had it for past 2-3 weeks. Will start NSAID and patient given stretching exercises. Patient also told to get heel pads/inserts for shoes. If worsens, will refer to Podiatry. Relevant Medications    naproxen (NAPROSYN) 500 mg tablet         Subjective:      Patient ID: Justyna Yi is a 65 y.o. male. Patient here for right foot pain for past 2-3 weeks. Was hurting on bottom of foot near heel. Patient also has pain in lower leg medial aspect. Feels better with rest. Not taking any medications. Has been taking it easy and doing better. Never had it before.        The following portions of the patient's history were reviewed and updated as appropriate:   Past Medical History:  He has a past medical history of Acquired absence of kidney, Acute prostatitis, BPH with obstruction/lower urinary tract symptoms, BPH without obstruction/lower urinary tract symptoms, Colon polyp, Elevated PSA, Family history of malignant neoplasm of kidney, Malignant neoplasm of unspecified kidney, except renal pelvis ([address]), Other microscopic hematuria, Other specified disorders of kidney and ureter, and Personal history of other malignant neoplasm of kidney. ,  _______________________________________________________________________  Medical Problems:  does not have any pertinent problems on file.,  _______________________________________________________________________  Past Surgical History:   has a past surgical history that includes RADICAL NEPHRECTOMY (Right, 2016); Prostate biopsy; Vasectomy; and Colonoscopy. ,  _______________________________________________________________________  Family History:  family history includes Aneurysm in his father; Atrial fibrillation in his mother; Breast cancer in his mother; Heart failure in his mother.,  _______________________________________________________________________  Social History:   reports that he has never smoked. He has never used smokeless tobacco. He reports current alcohol use of about 2.0 standard drinks of alcohol per week. He reports that he does not use drugs. ,  _______________________________________________________________________  Allergies:  has No Known Allergies. .  _______________________________________________________________________  Current Outpatient Medications   Medication Sig Dispense Refill   • naproxen (NAPROSYN) 500 mg tablet Take 1 tablet (500 mg total) by mouth 2 (two) times a day with meals 20 tablet 0     No current facility-administered medications for this visit.     _______________________________________________________________________  Review of Systems   Constitutional: Negative for fatigue and unexpected weight change. Respiratory: Negative for cough and shortness of breath. Cardiovascular: Negative for chest pain. Gastrointestinal: Negative for abdominal pain, constipation, diarrhea and vomiting. Musculoskeletal: Negative for arthralgias. Right foot and lower leg pain. Neurological: Negative for dizziness and headaches. Psychiatric/Behavioral: Negative for dysphoric mood. The patient is not nervous/anxious.           Objective:  Vitals:    08/23/23 1122 BP: 118/78   BP Location: Left arm   Patient Position: Sitting   Cuff Size: Standard   Pulse: 72   Resp: 16   Temp: 98.6 °F (37 °C)   TempSrc: Tympanic   SpO2: 98%   Weight: 91.6 kg (202 lb)   Height: 6' 1" (1.854 m)     Body mass index is 26.65 kg/m². Physical Exam  Vitals and nursing note reviewed. Constitutional:       Appearance: Normal appearance. He is well-developed and normal weight. Neck:      Thyroid: No thyromegaly. Cardiovascular:      Rate and Rhythm: Normal rate and regular rhythm. Heart sounds: Normal heart sounds. No murmur heard. Pulmonary:      Effort: Pulmonary effort is normal. No respiratory distress. Breath sounds: Normal breath sounds. No wheezing. Musculoskeletal:      Cervical back: Normal range of motion and neck supple. Right lower leg: No edema. Left lower leg: No edema. Comments: Tenderness to palpation right medial heel   Lymphadenopathy:      Cervical: No cervical adenopathy. Neurological:      Mental Status: He is alert and oriented to person, place, and time. Cranial Nerves: No cranial nerve deficit. Psychiatric:         Mood and Affect: Mood normal.         Behavior: Behavior normal.         Thought Content:  Thought content normal.         Judgment: Judgment normal.

## 2023-08-23 NOTE — ASSESSMENT & PLAN NOTE
Patient has had it for past 2-3 weeks. Will start NSAID and patient given stretching exercises. Patient also told to get heel pads/inserts for shoes. If worsens, will refer to Podiatry.

## 2023-08-31 ENCOUNTER — APPOINTMENT (OUTPATIENT)
Dept: LAB | Facility: CLINIC | Age: 63
End: 2023-08-31
Payer: COMMERCIAL

## 2023-08-31 DIAGNOSIS — R97.20 ELEVATED PSA: ICD-10-CM

## 2023-08-31 LAB
ANION GAP SERPL CALCULATED.3IONS-SCNC: 6 MMOL/L
BUN SERPL-MCNC: 19 MG/DL (ref 5–25)
CALCIUM SERPL-MCNC: 9.4 MG/DL (ref 8.4–10.2)
CHLORIDE SERPL-SCNC: 103 MMOL/L (ref 96–108)
CO2 SERPL-SCNC: 28 MMOL/L (ref 21–32)
CREAT SERPL-MCNC: 1.08 MG/DL (ref 0.6–1.3)
GFR SERPL CREATININE-BSD FRML MDRD: 73 ML/MIN/1.73SQ M
GLUCOSE P FAST SERPL-MCNC: 89 MG/DL (ref 65–99)
POTASSIUM SERPL-SCNC: 4.2 MMOL/L (ref 3.5–5.3)
PSA SERPL-MCNC: 13.58 NG/ML (ref 0–4)
SODIUM SERPL-SCNC: 137 MMOL/L (ref 135–147)

## 2023-08-31 PROCEDURE — 84153 ASSAY OF PSA TOTAL: CPT

## 2023-08-31 PROCEDURE — 80048 BASIC METABOLIC PNL TOTAL CA: CPT

## 2023-08-31 PROCEDURE — 36415 COLL VENOUS BLD VENIPUNCTURE: CPT

## 2023-09-06 ENCOUNTER — OFFICE VISIT (OUTPATIENT)
Dept: UROLOGY | Facility: CLINIC | Age: 63
End: 2023-09-06
Payer: COMMERCIAL

## 2023-09-06 VITALS
HEART RATE: 78 BPM | OXYGEN SATURATION: 100 % | SYSTOLIC BLOOD PRESSURE: 110 MMHG | BODY MASS INDEX: 26.77 KG/M2 | HEIGHT: 73 IN | DIASTOLIC BLOOD PRESSURE: 70 MMHG | WEIGHT: 202 LBS

## 2023-09-06 DIAGNOSIS — R97.20 ELEVATED PSA: Primary | ICD-10-CM

## 2023-09-06 PROCEDURE — 99213 OFFICE O/P EST LOW 20 MIN: CPT | Performed by: PHYSICIAN ASSISTANT

## 2023-09-06 NOTE — PROGRESS NOTES
UROLOGY PROGRESS NOTE   Patient Identifiers: Sydnee Silva (MRN [id number])  Date of Service: 9/6/2023    Subjective:   70-year-old man history of right renal cell cancer. He had a radical nephrectomy by [facility] in 2018. No recent imaging. He also has a history of elevated PSA and has had 2 previous prostate biopsies last in 2016 which was benign. His last PSA was 13.9. Free percent was over 34. Current PSA 5 8. MRI showed PI-RADS 2 with a 147 g prostate. He has no significant voiding complaints.     Reason for visit: Elevated PSA follow-up    Objective:     VITALS:    Vitals:    09/06/23 1423   BP: 110/70   Pulse: 78   SpO2: 100%     AUA SYMPTOM SCORE    Flowsheet Row Most Recent Value   AUA SYMPTOM SCORE    How often have you had a sensation of not emptying your bladder completely after you finished urinating? 0 (P)     How often have you had to urinate again less than two hours after you finished urinating? 0 (P)     How often have you found you stopped and started again several times when you urinate? 0 (P)     How often have you found it difficult to postpone urination? 0 (P)     How often have you had a weak urinary stream? 3 (P)     How often have you had to push or strain to begin urination? 0 (P)     How many times did you most typically get up to urinate from the time you went to bed at night until the time you got up in the morning? 5 (P)     Quality of Life: If you were to spend the rest of your life with your urinary condition just the way it is now, how would you feel about that? 0 (P)     AUA SYMPTOM SCORE 8 (P)             LABS:  No results found for: "HGB", "HCT", "WBC", "PLT"]    Lab Results   Component Value Date    K 4.2 08/31/2023     08/31/2023    CO2 28 08/31/2023    BUN 19 08/31/2023    CREATININE 1.08 08/31/2023    CALCIUM 9.4 08/31/2023   ]        INPATIENT MEDS:    Current Outpatient Medications:   •  naproxen (NAPROSYN) 500 mg tablet, Take 1 tablet (500 mg total) by mouth 2 (two) times a day with meals, Disp: 20 tablet, Rfl: 0      Physical Exam:   /70 (BP Location: Left arm, Patient Position: Sitting, Cuff Size: Large)   Pulse 78   Ht 6' 1" (1.854 m)   Wt 91.6 kg (202 lb)   SpO2 100%   BMI 26.65 kg/m²   GEN: no acute distress    RESP: breathing comfortably with no accessory muscle use    ABD: soft, non-tender, non-distended   INCISION:    EXT: no significant peripheral edema     RADIOLOGY:   IMPRESSION:     1. PI-RADSv2.1 Category 2 - Low (clinically significant cancer is unlikely to be present). 2.  Marked BPH with calculated prostate volume of 147 cc. Assessment:   #1. Elevated PSA  #2.   Right renal cell cancer    Plan:   -Follow-up in 6 months with PSA with free percent prior to visit  -  -  -

## 2024-02-19 ENCOUNTER — PROBLEM (OUTPATIENT)
Dept: URBAN - METROPOLITAN AREA CLINIC 6 | Facility: CLINIC | Age: 64
End: 2024-02-19

## 2024-02-19 DIAGNOSIS — D23.122: ICD-10-CM

## 2024-02-19 PROCEDURE — 92012 INTRM OPH EXAM EST PATIENT: CPT

## 2024-02-19 ASSESSMENT — TONOMETRY
OS_IOP_MMHG: 8
OD_IOP_MMHG: 9

## 2024-02-19 ASSESSMENT — VISUAL ACUITY
OS_CC: 20/25-1
OD_CC: 20/25-1

## 2024-03-01 ENCOUNTER — APPOINTMENT (OUTPATIENT)
Dept: LAB | Facility: AMBULARY SURGERY CENTER | Age: 64
End: 2024-03-01
Payer: COMMERCIAL

## 2024-03-01 DIAGNOSIS — R97.20 ELEVATED PSA: ICD-10-CM

## 2024-03-01 DIAGNOSIS — C79.9 METASTASIS FROM MALIGNANT TUMOR OF KIDNEY (HCC): ICD-10-CM

## 2024-03-01 DIAGNOSIS — R97.20 ELEVATED PROSTATE SPECIFIC ANTIGEN (PSA): Primary | ICD-10-CM

## 2024-03-01 DIAGNOSIS — C64.9 METASTASIS FROM MALIGNANT TUMOR OF KIDNEY (HCC): ICD-10-CM

## 2024-03-01 DIAGNOSIS — R31.29 MICROSCOPIC HEMATURIA: ICD-10-CM

## 2024-03-01 LAB
ALBUMIN SERPL BCP-MCNC: 4.1 G/DL (ref 3.5–5)
ALP SERPL-CCNC: 53 U/L (ref 34–104)
ALT SERPL W P-5'-P-CCNC: 14 U/L (ref 7–52)
ANION GAP SERPL CALCULATED.3IONS-SCNC: 6 MMOL/L
AST SERPL W P-5'-P-CCNC: 16 U/L (ref 13–39)
BACTERIA UR QL AUTO: ABNORMAL /HPF
BILIRUB SERPL-MCNC: 0.63 MG/DL (ref 0.2–1)
BILIRUB UR QL STRIP: NEGATIVE
BUN SERPL-MCNC: 18 MG/DL (ref 5–25)
CALCIUM SERPL-MCNC: 9.4 MG/DL (ref 8.4–10.2)
CHLORIDE SERPL-SCNC: 105 MMOL/L (ref 96–108)
CHOLEST SERPL-MCNC: 273 MG/DL
CLARITY UR: CLEAR
CO2 SERPL-SCNC: 29 MMOL/L (ref 21–32)
COLOR UR: ABNORMAL
CREAT SERPL-MCNC: 1.16 MG/DL (ref 0.6–1.3)
ERYTHROCYTE [DISTWIDTH] IN BLOOD BY AUTOMATED COUNT: 12.9 % (ref 11.6–15.1)
GFR SERPL CREATININE-BSD FRML MDRD: 67 ML/MIN/1.73SQ M
GLUCOSE P FAST SERPL-MCNC: 84 MG/DL (ref 65–99)
GLUCOSE UR STRIP-MCNC: ABNORMAL MG/DL
HCT VFR BLD AUTO: 46.6 % (ref 36.5–49.3)
HDLC SERPL-MCNC: 51 MG/DL
HGB BLD-MCNC: 15 G/DL (ref 12–17)
HGB UR QL STRIP.AUTO: NEGATIVE
KETONES UR STRIP-MCNC: NEGATIVE MG/DL
LDLC SERPL CALC-MCNC: 190 MG/DL (ref 0–100)
LEUKOCYTE ESTERASE UR QL STRIP: NEGATIVE
MCH RBC QN AUTO: 30.5 PG (ref 26.8–34.3)
MCHC RBC AUTO-ENTMCNC: 32.2 G/DL (ref 31.4–37.4)
MCV RBC AUTO: 95 FL (ref 82–98)
NITRITE UR QL STRIP: NEGATIVE
NON-SQ EPI CELLS URNS QL MICRO: ABNORMAL /HPF
NONHDLC SERPL-MCNC: 222 MG/DL
PH UR STRIP.AUTO: 6 [PH]
PLATELET # BLD AUTO: 231 THOUSANDS/UL (ref 149–390)
PMV BLD AUTO: 10.3 FL (ref 8.9–12.7)
POTASSIUM SERPL-SCNC: 3.9 MMOL/L (ref 3.5–5.3)
PROT SERPL-MCNC: 7.2 G/DL (ref 6.4–8.4)
PROT UR STRIP-MCNC: ABNORMAL MG/DL
RBC # BLD AUTO: 4.91 MILLION/UL (ref 3.88–5.62)
RBC #/AREA URNS AUTO: ABNORMAL /HPF
SODIUM SERPL-SCNC: 140 MMOL/L (ref 135–147)
SP GR UR STRIP.AUTO: 1.02 (ref 1–1.03)
TRIGL SERPL-MCNC: 158 MG/DL
UROBILINOGEN UR STRIP-ACNC: <2 MG/DL
WBC # BLD AUTO: 5.32 THOUSAND/UL (ref 4.31–10.16)
WBC #/AREA URNS AUTO: ABNORMAL /HPF

## 2024-03-01 PROCEDURE — 81001 URINALYSIS AUTO W/SCOPE: CPT

## 2024-03-01 PROCEDURE — 80061 LIPID PANEL: CPT

## 2024-03-01 PROCEDURE — 85027 COMPLETE CBC AUTOMATED: CPT

## 2024-03-01 PROCEDURE — 84153 ASSAY OF PSA TOTAL: CPT

## 2024-03-01 PROCEDURE — 84154 ASSAY OF PSA FREE: CPT

## 2024-03-01 PROCEDURE — 36415 COLL VENOUS BLD VENIPUNCTURE: CPT

## 2024-03-01 PROCEDURE — 80053 COMPREHEN METABOLIC PANEL: CPT

## 2024-03-02 LAB
PSA FREE MFR SERPL: 36.9 %
PSA FREE SERPL-MCNC: 4.46 NG/ML
PSA SERPL-MCNC: 12.1 NG/ML (ref 0–4)

## 2024-03-06 ENCOUNTER — OFFICE VISIT (OUTPATIENT)
Dept: UROLOGY | Facility: CLINIC | Age: 64
End: 2024-03-06
Payer: COMMERCIAL

## 2024-03-06 VITALS
SYSTOLIC BLOOD PRESSURE: 108 MMHG | HEIGHT: 73 IN | WEIGHT: 203 LBS | DIASTOLIC BLOOD PRESSURE: 70 MMHG | BODY MASS INDEX: 26.9 KG/M2 | OXYGEN SATURATION: 95 % | HEART RATE: 79 BPM

## 2024-03-06 DIAGNOSIS — R97.20 ELEVATED PSA: Primary | ICD-10-CM

## 2024-03-06 PROCEDURE — 99213 OFFICE O/P EST LOW 20 MIN: CPT | Performed by: PHYSICIAN ASSISTANT

## 2024-03-07 NOTE — PROGRESS NOTES
UROLOGY PROGRESS NOTE   Patient Identifiers: Luis Lerner (MRN [id number])  Date of Service: 3/6/2024    Subjective:   63-year-old male with history of right renal cell cancer.  He had a nephrectomy by [facility] in 2018 and has completed surveillance.  He has a history of elevated PSA with 2 previous prostate biopsies.  His high PSA was 13.9.  His current PSA 12.1 with a free percent of 37.  Multiparametric MRI showed PI-RADS 2.  Prostate size 147 g.    Reason for visit: Elevated PSA follow-up    Objective:     VITALS:    Vitals:    03/06/24 1053   BP: 108/70   Pulse: 79   SpO2: 95%     AUA SYMPTOM SCORE      Flowsheet Row Most Recent Value   AUA SYMPTOM SCORE    How often have you had a sensation of not emptying your bladder completely after you finished urinating? 1 (P)    How often have you had to urinate again less than two hours after you finished urinating? 2 (P)    How often have you found you stopped and started again several times when you urinate? 1 (P)    How often have you found it difficult to postpone urination? 0 (P)    How often have you had a weak urinary stream? 3 (P)    How often have you had to push or strain to begin urination? 1 (P)    How many times did you most typically get up to urinate from the time you went to bed at night until the time you got up in the morning? 1 (P)    Quality of Life: If you were to spend the rest of your life with your urinary condition just the way it is now, how would you feel about that? 1 (P)    AUA SYMPTOM SCORE 9 (P)               LABS:  Lab Results   Component Value Date    HGB 15.0 03/01/2024    HCT 46.6 03/01/2024    WBC 5.32 03/01/2024     03/01/2024   ]    Lab Results   Component Value Date    K 3.9 03/01/2024     03/01/2024    CO2 29 03/01/2024    BUN 18 03/01/2024    CREATININE 1.16 03/01/2024    CALCIUM 9.4 03/01/2024   ]        INPATIENT MEDS:  No current outpatient medications on file.      Physical Exam:   /70 (BP Location: Left  "arm, Patient Position: Sitting, Cuff Size: Adult)   Pulse 79   Ht 6' 1\" (1.854 m)   Wt 92.1 kg (203 lb)   SpO2 95%   BMI 26.78 kg/m²   GEN: no acute distress    RESP: breathing comfortably with no accessory muscle use    ABD: soft, non-tender, non-distended   INCISION:    EXT: no significant peripheral edema       RADIOLOGY:   IMPRESSION:     1. PI-RADSv2.1 Category 2 - Low (clinically significant cancer is unlikely to be present).     2.  Marked BPH with calculated prostate volume of 147 cc.     Prostate gland boundaries were segmented using 3D advanced post-processing on an independent [employer] system workstation with active physician participation    Assessment:   #1.  Elevated PSA    Plan:   -Follow-up in 6 months with PSA prior to visit  -We talked about HoLEP and robotic simple prostatectomy  -  -          "

## 2024-03-11 ENCOUNTER — PROCEDURE ONLY (OUTPATIENT)
Dept: URBAN - METROPOLITAN AREA CLINIC 6 | Facility: CLINIC | Age: 64
End: 2024-03-11

## 2024-03-11 DIAGNOSIS — D23.122: ICD-10-CM

## 2024-03-11 PROCEDURE — 67840 REMOVE EYELID LESION: CPT

## 2024-03-11 PROCEDURE — 92012 INTRM OPH EXAM EST PATIENT: CPT | Mod: 25

## 2024-03-11 ASSESSMENT — VISUAL ACUITY
OD_CC: 20/25-2
OS_CC: 20/20

## 2024-03-11 ASSESSMENT — TONOMETRY
OD_IOP_MMHG: 12
OS_IOP_MMHG: 16

## 2024-04-17 ENCOUNTER — NURSE TRIAGE (OUTPATIENT)
Age: 64
End: 2024-04-17

## 2024-04-17 DIAGNOSIS — R97.20 ELEVATED PSA: Primary | ICD-10-CM

## 2024-04-17 DIAGNOSIS — R39.9 UTI SYMPTOMS: ICD-10-CM

## 2024-04-17 NOTE — TELEPHONE ENCOUNTER
Pts wife called, sts pt is experiencing urinary urgency, urinary frequency and penis tenderness/pain.  Wife sts she works for Dr. [employer] and had pt do a urine dip which indicated microscopic hematuria.  Pt is scheduled for 6 month f/u on 9/16/24.  Next avail f/u is 8/29/24.  Warm transferred to triage

## 2024-04-17 NOTE — TELEPHONE ENCOUNTER
Pt's wife called in with concerns of pt having intermittent penis pain. Denies any redness or swelling. Per protocol, should be seen in two weeks but no availability. His follow up appointment isn't until September. Please advise.     Reason for Disposition   The patient has intermittent, moderate pain    Answer Questions - Initial Assessment  When did this start: 2-3 days ago    Did you suffer an injury to the groin area: no    Have you noticed any penile swelling: no    Is there any redness or bruising: no     Do you have a history of penile issues: no    Is pain constant or intermittent: intermittent    Do you have any urinary symptoms, Dysuria: urgency    Do you have a temperature of 101 or higher: no    Have you had any recent urological procedure or surgery: no     Were you prescribed any medications for this: no    Protocols used: Penile Pain / Paraphimosis

## 2024-04-18 ENCOUNTER — APPOINTMENT (OUTPATIENT)
Dept: LAB | Facility: AMBULARY SURGERY CENTER | Age: 64
End: 2024-04-18
Payer: COMMERCIAL

## 2024-04-18 DIAGNOSIS — R97.20 ELEVATED PSA: ICD-10-CM

## 2024-04-18 DIAGNOSIS — R39.9 UTI SYMPTOMS: ICD-10-CM

## 2024-04-18 LAB
BACTERIA UR QL AUTO: ABNORMAL /HPF
BILIRUB UR QL STRIP: NEGATIVE
CLARITY UR: CLEAR
COLOR UR: ABNORMAL
GLUCOSE UR STRIP-MCNC: ABNORMAL MG/DL
HGB UR QL STRIP.AUTO: NEGATIVE
KETONES UR STRIP-MCNC: NEGATIVE MG/DL
LEUKOCYTE ESTERASE UR QL STRIP: NEGATIVE
MUCOUS THREADS UR QL AUTO: ABNORMAL
NITRITE UR QL STRIP: NEGATIVE
NON-SQ EPI CELLS URNS QL MICRO: ABNORMAL /HPF
PH UR STRIP.AUTO: 6 [PH]
PROT UR STRIP-MCNC: ABNORMAL MG/DL
RBC #/AREA URNS AUTO: ABNORMAL /HPF
SP GR UR STRIP.AUTO: 1.02 (ref 1–1.03)
UROBILINOGEN UR STRIP-ACNC: <2 MG/DL
WBC #/AREA URNS AUTO: ABNORMAL /HPF

## 2024-04-18 PROCEDURE — 81001 URINALYSIS AUTO W/SCOPE: CPT

## 2024-04-18 PROCEDURE — 87086 URINE CULTURE/COLONY COUNT: CPT

## 2024-04-18 NOTE — TELEPHONE ENCOUNTER
Spoke with patient and he stated that he feels somewhat better. He reports he had a few drinks over the weekend and thinks that contributed to his symptoms.     Advised we would recommend urine testing and a PVR. Patient would like to speak with wife first who works in a separate urology office and get her opinion.     Orders placed for urine testing in case he wants to go. Offered for him to come today at 1130 for PVR. He said he will call back and let me know     If patient calls back please teams me and if I'm available I will speak with him

## 2024-04-19 LAB — BACTERIA UR CULT: NORMAL

## 2024-09-06 ENCOUNTER — TELEPHONE (OUTPATIENT)
Dept: UROLOGY | Facility: CLINIC | Age: 64
End: 2024-09-06

## 2024-09-13 ENCOUNTER — APPOINTMENT (OUTPATIENT)
Dept: LAB | Facility: AMBULARY SURGERY CENTER | Age: 64
End: 2024-09-13
Payer: COMMERCIAL

## 2024-09-13 LAB — PSA SERPL-MCNC: 24.2 NG/ML (ref 0–4)

## 2024-09-16 ENCOUNTER — OFFICE VISIT (OUTPATIENT)
Dept: UROLOGY | Facility: CLINIC | Age: 64
End: 2024-09-16
Payer: COMMERCIAL

## 2024-09-16 VITALS
HEIGHT: 73 IN | BODY MASS INDEX: 25.71 KG/M2 | WEIGHT: 194 LBS | DIASTOLIC BLOOD PRESSURE: 74 MMHG | SYSTOLIC BLOOD PRESSURE: 136 MMHG | HEART RATE: 82 BPM | OXYGEN SATURATION: 95 %

## 2024-09-16 DIAGNOSIS — R97.20 ELEVATED PSA: Primary | ICD-10-CM

## 2024-09-16 PROCEDURE — 99213 OFFICE O/P EST LOW 20 MIN: CPT | Performed by: PHYSICIAN ASSISTANT

## 2024-09-16 RX ORDER — TAMSULOSIN HYDROCHLORIDE 0.4 MG/1
CAPSULE ORAL
COMMUNITY
Start: 2024-04-22

## 2024-09-16 NOTE — PROGRESS NOTES
UROLOGY PROGRESS NOTE   Patient Identifiers: Luis Lerner (MRN [id number])  Date of Service: 9/16/2024    Subjective:   63-year-old man with history of right renal cell cancer.  He had a nephrectomy by [facility] in 2018.  He also has a history of elevated PSA.  2 previous prostate biopsies.  His highest PSA was 13.9.  He is also had several MRIs the latest showing PI-RADS 2 with 147 g prostate.  Here for 6-month follow-up his PSA is 25.  He did recently have a febrile illness possibly COVID.  Historically his free PSA has been very high as well last 1 was around 36.  Reason for visit: Elevated PSA follow-up    Objective:     VITALS:    Vitals:    09/16/24 1058   BP: 136/74   Pulse: 82   SpO2: 95%     AUA SYMPTOM SCORE      Flowsheet Row Most Recent Value   AUA SYMPTOM SCORE    How often have you had a sensation of not emptying your bladder completely after you finished urinating? 0 (P)     How often have you had to urinate again less than two hours after you finished urinating? 1 (P)     How often have you found you stopped and started again several times when you urinate? 0 (P)     How often have you found it difficult to postpone urination? 0 (P)     How often have you had a weak urinary stream? 0 (P)     How often have you had to push or strain to begin urination? 0 (P)     How many times did you most typically get up to urinate from the time you went to bed at night until the time you got up in the morning? 3 (P)     Quality of Life: If you were to spend the rest of your life with your urinary condition just the way it is now, how would you feel about that? 0 (P)     AUA SYMPTOM SCORE 4 (P)                LABS:  Lab Results   Component Value Date    HGB 15.0 03/01/2024    HCT 46.6 03/01/2024    WBC 5.32 03/01/2024     03/01/2024   ]    Lab Results   Component Value Date    K 3.9 03/01/2024     03/01/2024    CO2 29 03/01/2024    BUN 18 03/01/2024    CREATININE 1.16 03/01/2024    CALCIUM 9.4  "03/01/2024   ]        INPATIENT MEDS:    Current Outpatient Medications:     tamsulosin (FLOMAX) 0.4 mg, , Disp: , Rfl:       Physical Exam:   /74 (BP Location: Left arm, Patient Position: Sitting, Cuff Size: Standard)   Pulse 82   Ht 6' 1\" (1.854 m)   Wt 88 kg (194 lb)   SpO2 95%   BMI 25.60 kg/m²   GEN: no acute distress    RESP: breathing comfortably with no accessory muscle use    ABD: soft, non-tender, non-distended   INCISION:    EXT: no significant peripheral edema   (Male): Penis circumcised, phallus normal, meatus patent.  Testicles descended into scrotum bilaterally without masses nor tenderness.  No inguinal hernias bilaterally.  [place]IA: Prostate is enlarged at 80+ grams. The prostate is not boggy. The prostate is not tender.  No nodules noted      RADIOLOGY:   none     Assessment:   #1.  Elevated PSA    Plan:   -I reviewed the findings with the patient and his wife  -I suggested he repeat his PSA in about 6 weeks to confirm elevation  -We discussed repeating his MRI, possible prostate biopsy we also talked about starting finasteride  -We discussed robotic simple prostatectomy  -We will discuss the results once available and arrange for appropriate follow-up        "

## 2024-11-22 ENCOUNTER — APPOINTMENT (OUTPATIENT)
Dept: LAB | Facility: AMBULARY SURGERY CENTER | Age: 64
End: 2024-11-22
Payer: COMMERCIAL

## 2024-11-22 DIAGNOSIS — R97.20 ELEVATED PSA: ICD-10-CM

## 2024-11-22 LAB
PSA FREE MFR SERPL: 30.59 %
PSA FREE SERPL-MCNC: 4.81 NG/ML
PSA SERPL-MCNC: 15.73 NG/ML (ref 0–4)

## 2024-11-22 PROCEDURE — 84154 ASSAY OF PSA FREE: CPT

## 2024-11-22 PROCEDURE — 36415 COLL VENOUS BLD VENIPUNCTURE: CPT

## 2024-11-22 PROCEDURE — 84153 ASSAY OF PSA TOTAL: CPT

## 2024-11-25 ENCOUNTER — OFFICE VISIT (OUTPATIENT)
Dept: FAMILY MEDICINE CLINIC | Facility: CLINIC | Age: 64
End: 2024-11-25
Payer: COMMERCIAL

## 2024-11-25 VITALS
RESPIRATION RATE: 14 BRPM | BODY MASS INDEX: 26.9 KG/M2 | WEIGHT: 203 LBS | SYSTOLIC BLOOD PRESSURE: 92 MMHG | TEMPERATURE: 97.2 F | HEART RATE: 87 BPM | HEIGHT: 73 IN | DIASTOLIC BLOOD PRESSURE: 66 MMHG | OXYGEN SATURATION: 97 %

## 2024-11-25 DIAGNOSIS — C64.1 RENAL CELL ADENOCARCINOMA, RIGHT (HCC): ICD-10-CM

## 2024-11-25 DIAGNOSIS — M54.12 CERVICAL RADICULOPATHY: Primary | ICD-10-CM

## 2024-11-25 PROCEDURE — 99214 OFFICE O/P EST MOD 30 MIN: CPT | Performed by: FAMILY MEDICINE

## 2024-11-25 RX ORDER — BACLOFEN 10 MG/1
10 TABLET ORAL 3 TIMES DAILY
Qty: 30 TABLET | Refills: 0 | Status: SHIPPED | OUTPATIENT
Start: 2024-11-25

## 2024-11-25 NOTE — PROGRESS NOTES
Subjective:      Patient ID: Luis Lerner is a 64 y.o. male.    Right neck , shoulder and arm pain  States 5 day ago he hit his head while climbing in his friends truck (high) and felt a jolt  But prior to that also had been having neck pain  RCC- cannot take nsaids  Also has         Past Medical History:   Diagnosis Date    Acquired absence of kidney     Acute prostatitis     BPH with obstruction/lower urinary tract symptoms     BPH without obstruction/lower urinary tract symptoms     Cancer (HCC) June 2016    Kidney removed.    Colon polyp     Elevated PSA     Family history of malignant neoplasm of kidney     Malignant neoplasm of unspecified kidney, except renal pelvis (HCC)     Other microscopic hematuria     Other specified disorders of kidney and ureter     Personal history of other malignant neoplasm of kidney        Family History   Problem Relation Age of Onset    Breast cancer Mother     Heart failure Mother     Atrial fibrillation Mother     Cancer Mother     Aneurysm Father         brain    Cancer Father        Past Surgical History:   Procedure Laterality Date    COLONOSCOPY      PROSTATE BIOPSY      RADICAL NEPHRECTOMY Right 2016    VASECTOMY          reports that he has never smoked. He has never used smokeless tobacco. He reports current alcohol use of about 6.0 standard drinks of alcohol per week. He reports that he does not use drugs.      Current Outpatient Medications:     baclofen 10 mg tablet, Take 1 tablet (10 mg total) by mouth 3 (three) times a day, Disp: 30 tablet, Rfl: 0    tamsulosin (FLOMAX) 0.4 mg, , Disp: , Rfl:     The following portions of the patient's history were reviewed and updated as appropriate: allergies, current medications, past family history, past medical history, past social history, past surgical history and problem list.    Review of Systems      PHQ-2/9 Depression Screening    Little interest or pleasure in doing things: 0 - not at all  Feeling down, depressed,  "or hopeless: 0 - not at all  PHQ-2 Score: 0  PHQ-2 Interpretation: Negative depression screen             Objective:    BP 92/66 (BP Location: Left arm, Patient Position: Sitting, Cuff Size: Adult)   Pulse 87   Temp (!) 97.2 °F (36.2 °C) (Tympanic)   Resp 14   Ht 6' 1\" (1.854 m)   Wt 92.1 kg (203 lb)   SpO2 97%   BMI 26.78 kg/m²      Physical Exam      Recent Results (from the past 52 weeks)   PSA, total and free    Collection Time: 03/01/24  6:52 AM   Result Value Ref Range    Prostate Specific Antigen Total 12.1 (H) 0.0 - 4.0 ng/mL    PSA, Free 4.46 N/A ng/mL    PSA, Free Pct 36.9 %   Urinalysis with microscopic    Collection Time: 03/01/24  7:03 AM   Result Value Ref Range    Color, UA Light Yellow     Clarity, UA Clear     Specific Gravity, UA 1.024 1.003 - 1.030    pH, UA 6.0 4.5, 5.0, 5.5, 6.0, 6.5, 7.0, 7.5, 8.0    Leukocytes, UA Negative Negative    Nitrite, UA Negative Negative    Protein, UA 30 (1+) (A) Negative mg/dl    Glucose, UA 70 (7/100%) (A) Negative mg/dl    Ketones, UA Negative Negative mg/dl    Urobilinogen, UA <2.0 <2.0 mg/dl mg/dl    Bilirubin, UA Negative Negative    Occult Blood, UA Negative Negative    RBC, UA 1-2 None Seen, 1-2 /hpf    WBC, UA None Seen None Seen, 1-2 /hpf    Epithelial Cells None Seen None Seen, Occasional /hpf    Bacteria, UA None Seen None Seen, Occasional /hpf   CBC and Platelet    Collection Time: 03/01/24  7:03 AM   Result Value Ref Range    WBC 5.32 4.31 - 10.16 Thousand/uL    RBC 4.91 3.88 - 5.62 Million/uL    Hemoglobin 15.0 12.0 - 17.0 g/dL    Hematocrit 46.6 36.5 - 49.3 %    MCV 95 82 - 98 fL    MCH 30.5 26.8 - 34.3 pg    MCHC 32.2 31.4 - 37.4 g/dL    RDW 12.9 11.6 - 15.1 %    Platelets 231 149 - 390 Thousands/uL    MPV 10.3 8.9 - 12.7 fL   Lipid panel    Collection Time: 03/01/24  7:03 AM   Result Value Ref Range    Cholesterol 273 (H) See Comment mg/dL    Triglycerides 158 (H) See Comment mg/dL    HDL, Direct 51 >=40 mg/dL    LDL Calculated 190 (H) 0 - " 100 mg/dL    Non-HDL-Chol (CHOL-HDL) 222 mg/dl   Comprehensive metabolic panel    Collection Time: 03/01/24  7:03 AM   Result Value Ref Range    Sodium 140 135 - 147 mmol/L    Potassium 3.9 3.5 - 5.3 mmol/L    Chloride 105 96 - 108 mmol/L    CO2 29 21 - 32 mmol/L    ANION GAP 6 mmol/L    BUN 18 5 - 25 mg/dL    Creatinine 1.16 0.60 - 1.30 mg/dL    Glucose, Fasting 84 65 - 99 mg/dL    Calcium 9.4 8.4 - 10.2 mg/dL    AST 16 13 - 39 U/L    ALT 14 7 - 52 U/L    Alkaline Phosphatase 53 34 - 104 U/L    Total Protein 7.2 6.4 - 8.4 g/dL    Albumin 4.1 3.5 - 5.0 g/dL    Total Bilirubin 0.63 0.20 - 1.00 mg/dL    eGFR 67 ml/min/1.73sq m   Urinalysis with microscopic    Collection Time: 04/18/24 11:42 AM   Result Value Ref Range    Color, UA Light Yellow     Clarity, UA Clear     Specific Gravity, UA 1.019 1.003 - 1.030    pH, UA 6.0 4.5, 5.0, 5.5, 6.0, 6.5, 7.0, 7.5, 8.0    Leukocytes, UA Negative Negative    Nitrite, UA Negative Negative    Protein, UA Trace (A) Negative mg/dl    Glucose, UA Trace (A) Negative mg/dl    Ketones, UA Negative Negative mg/dl    Urobilinogen, UA <2.0 <2.0 mg/dl mg/dl    Bilirubin, UA Negative Negative    Occult Blood, UA Negative Negative    RBC, UA 1-2 None Seen, 1-2 /hpf    WBC, UA 1-2 None Seen, 1-2 /hpf    Epithelial Cells None Seen None Seen, Occasional /hpf    Bacteria, UA None Seen None Seen, Occasional /hpf    MUCUS THREADS Occasional (A) None Seen   Urine culture    Collection Time: 04/18/24 11:42 AM    Specimen: Urine, Clean Catch   Result Value Ref Range    Urine Culture No Growth <1000 cfu/mL    PSA Total, Diagnostic    Collection Time: 09/13/24 10:23 AM   Result Value Ref Range    PSA, Diagnostic 24.204 (H) 0.000 - 4.000 ng/mL   PSA, total and free    Collection Time: 11/22/24  7:04 AM   Result Value Ref Range    PSA, Diagnostic 15.730 (H) 0.000 - 4.000 ng/mL    PSA, Free 4.812 ng/mL    PSA % Free 30.591 %       Laboratory Results: I have personally reviewed the pertinent laboratory  results/reports     Radiology/Other Diagnostic Testing Results: I have reviewed the following imaging and agree with the interpretation below.    MRI prostate multiparametric wo w contrast  Result Date: 3/27/2023  MULTIPARAMETRIC MRI OF THE PROSTATE WITH AND WITHOUT CONTRAST-WITH 3-D POSTPROCESSING. INDICATION:   R97.20: Elevated prostate specific antigen (PSA). COMPARISON: MR prostate October 7, 2020 PSA LEVEL: 13.9 ng/mL on March 2, 2023. PRIOR BIOPSY DATE: Two prior biopsies, most recent was in 2016. BIOPSY RESULTS: Benign. TECHNIQUE: The following pulse sequences were obtained:  Small field-of-view axial T1-weighted and multiplanar T2-weighted images; DWI axial and ADC map; large field of view axial T2 weighted images; T1w in-phase and opposed-phase axials of entire pelvis  and dynamic 3D T1w axial before and during IV contrast injection. CONTRAST:  Gadobutrol (Gadavist) 9 mL of Gadobutrol injection (SINGLE-DOSE) TECHNICAL LIMITATIONS: None. FINDINGS: PROSTATE:   Size: 6.6 x 6.4 x 7.1 cm = 147 cc.   Post-biopsy hemorrhage:  None.   Central gland enlargement (BPH): Marked. Focal lesions - No dominant lesion. Heterogeneous peripheral zone. PI-RADSv2.1 Category 2 - Low (clinically significant cancer unlikely). SEMINAL VESICLES: Unremarkable Note: Clinically significant cancer is defined on pathology/histology as Prinsburg score greater than or equal to 7, and/or volume of greater than or equal to 0.5 mL, and/or extraprostatic extension. URINARY BLADDER: Unremarkable. LYMPH NODES: No pelvic lymphadenopathy. BONES: No suspicious osseous lesion.     1. PI-RADSv2.1 Category 2 - Low (clinically significant cancer is unlikely to be present). 2.  Marked BPH with calculated prostate volume of 147 cc. Prostate gland boundaries were segmented using 3D advanced post-processing on an independent [employer] system workstation with active physician participation.  Workstation performed: [id number]        Assessment/Plan:  1. Cervical  "radiculopathy  -     XR spine cervical 2 or 3 vw injury; Future; Expected date: 11/25/2024  -     baclofen 10 mg tablet; Take 1 tablet (10 mg total) by mouth 3 (three) times a day  2. Renal cell adenocarcinoma, right (HCC)      Discussed to take baclofen as needed for pain and xray of cervical spine without injury.  Can use topical analgesics (non nsaids) lidocaine or numbing agents.  Refused PT  F/u if not improved             Read package inserts for all medications before starting a new medications, call me if you have any questions.    Patient was given opportunity to ask questions and all questions were answered.    Disclaimer: Portions of the record may have been created with voice recognition software. Occasional wrong word or \"sound a like\" substitutions may have occurred due to the inherent limitations of voice recognition software. Read the chart carefully and recognize, using context, where substitutions have occurred. I have used the Epic copy/forward function to compose this note. I have reviewed my current note to ensure it reflects the current patient status, exam, assessment and plan.    "

## 2024-12-04 ENCOUNTER — HOSPITAL ENCOUNTER (OUTPATIENT)
Dept: RADIOLOGY | Facility: HOSPITAL | Age: 64
Discharge: HOME | End: 2024-12-04
Payer: COMMERCIAL

## 2024-12-04 DIAGNOSIS — M54.12 CERVICAL RADICULOPATHY: ICD-10-CM

## 2024-12-04 PROCEDURE — 72040 X-RAY EXAM NECK SPINE 2-3 VW: CPT

## 2024-12-05 ENCOUNTER — RESULTS FOLLOW-UP (OUTPATIENT)
Dept: FAMILY MEDICINE CLINIC | Facility: CLINIC | Age: 64
End: 2024-12-05

## 2024-12-06 ENCOUNTER — TELEPHONE (OUTPATIENT)
Dept: FAMILY MEDICINE CLINIC | Facility: CLINIC | Age: 64
End: 2024-12-06

## 2024-12-06 DIAGNOSIS — M54.2 NECK PAIN: Primary | ICD-10-CM

## 2024-12-06 RX ORDER — METHYLPREDNISOLONE 4 MG/1
TABLET ORAL
Qty: 21 EACH | Refills: 0 | Status: SHIPPED | OUTPATIENT
Start: 2024-12-06

## 2024-12-06 NOTE — TELEPHONE ENCOUNTER
Patient was seen by Dr. Gannon for shoulder pain and wife stating it is not going away.  She wants to know if he can be prescribed prednisone for his pinched nerve and inflammation.  He is not sleeping because of pain.  Please send to [facility].  Thank you

## 2024-12-06 NOTE — TELEPHONE ENCOUNTER
Tigist lee of yours is wondering if you can order this for him. He is really uncomfortable and in a lot of pain. Normally sees Dr Gunn

## 2025-02-07 ENCOUNTER — TELEPHONE (OUTPATIENT)
Age: 65
End: 2025-02-07

## 2025-02-07 ENCOUNTER — OFFICE VISIT (OUTPATIENT)
Dept: FAMILY MEDICINE CLINIC | Facility: CLINIC | Age: 65
End: 2025-02-07
Payer: COMMERCIAL

## 2025-02-07 VITALS
SYSTOLIC BLOOD PRESSURE: 110 MMHG | HEART RATE: 80 BPM | WEIGHT: 203 LBS | DIASTOLIC BLOOD PRESSURE: 74 MMHG | OXYGEN SATURATION: 97 % | HEIGHT: 73 IN | BODY MASS INDEX: 26.9 KG/M2 | RESPIRATION RATE: 16 BRPM

## 2025-02-07 DIAGNOSIS — R97.20 ELEVATED PSA: ICD-10-CM

## 2025-02-07 DIAGNOSIS — R97.20 ELEVATED PSA: Primary | ICD-10-CM

## 2025-02-07 DIAGNOSIS — E78.00 HYPERCHOLESTEROLEMIA: ICD-10-CM

## 2025-02-07 DIAGNOSIS — Z00.00 ENCOUNTER FOR ANNUAL WELLNESS VISIT: ICD-10-CM

## 2025-02-07 DIAGNOSIS — C64.1 RENAL CELL ADENOCARCINOMA, RIGHT (HCC): ICD-10-CM

## 2025-02-07 DIAGNOSIS — F41.9 ANXIETY: Primary | ICD-10-CM

## 2025-02-07 PROBLEM — Z86.0100 HISTORY OF COLON POLYPS: Status: RESOLVED | Noted: 2021-08-09 | Resolved: 2025-02-07

## 2025-02-07 PROCEDURE — 99213 OFFICE O/P EST LOW 20 MIN: CPT | Performed by: FAMILY MEDICINE

## 2025-02-07 PROCEDURE — 99396 PREV VISIT EST AGE 40-64: CPT | Performed by: FAMILY MEDICINE

## 2025-02-07 RX ORDER — ALPRAZOLAM 0.5 MG
0.5 TABLET ORAL 3 TIMES DAILY PRN
Qty: 15 TABLET | Refills: 0 | Status: SHIPPED | OUTPATIENT
Start: 2025-02-07

## 2025-02-07 NOTE — ASSESSMENT & PLAN NOTE
LDL was 190 in March 2024. Recheck lipids and to consider starting medication. Advised pt to follow a low cholesterol diet and to exercise on a regular basis.   Orders:  •  Lipid panel; Future

## 2025-02-07 NOTE — TELEPHONE ENCOUNTER
Per last office note from kimberly-      Assessment:   #1.  Elevated PSA     Plan:   -I reviewed the findings with the patient and his wife  -I suggested he repeat his PSA in about 6 weeks to confirm elevation  -We discussed repeating his MRI, possible prostate biopsy we also talked about starting finasteride  -We discussed robotic simple prostatectomy  -We will discuss the results once available and arrange for appropriate follow-up                Please review patients call with concerns and advise

## 2025-02-07 NOTE — ASSESSMENT & PLAN NOTE
CPE done. Last Tdap was in 2016. Had COVID vaccines. Recommend flu shot, Prevnar 20, and Shingrix series. Will recheck lipids. Other labs up to date. Last colonoscopy was in November 2021 by Dr Ayala and patient had 1 polyp. Next one is due in November 2026. Pt advised to follow a well balanced, heart healthy diet and to exercise on a regular basis. Not a smoker. Blood pressure ok.

## 2025-02-07 NOTE — ASSESSMENT & PLAN NOTE
PSA lower at 15.7 in November 2024. Patient has been seeing Urology. Continue management per them. Patient advised to schedule follow-up visit.

## 2025-02-07 NOTE — ASSESSMENT & PLAN NOTE
Patient gets symptoms when goes to dentist and flies. Will give xanax 0.5 mg to take 30 minutes before as needed.   Orders:  •  ALPRAZolam (XANAX) 0.5 mg tablet; Take 1 tablet (0.5 mg total) by mouth 3 (three) times a day as needed for anxiety

## 2025-02-07 NOTE — PATIENT INSTRUCTIONS
"Patient Education     Routine physical for adults   The Basics   Written by the doctors and editors at [facility]   What is a physical? -- A physical is a routine visit, or \"check-up,\" with your doctor. You might also hear it called a \"wellness visit\" or \"preventive visit.\"  During each visit, the doctor will:   Ask about your physical and mental health   Ask about your habits, behaviors, and lifestyle   Do an exam   Give you vaccines if needed   Talk to you about any medicines you take   Give advice about your health   Answer your questions  Getting regular check-ups is an important part of taking care of your health. It can help your doctor find and treat any problems you have. But it's also important for preventing health problems.  A routine physical is different from a \"sick visit.\" A sick visit is when you see a doctor because of a health concern or problem. Since physicals are scheduled ahead of time, you can think about what you want to ask the doctor.  How often should I get a physical? -- It depends on your age and health. In general, for people age 21 years and older:   If you are younger than 50 years, you might be able to get a physical every 3 years.   If you are 50 years or older, your doctor might recommend a physical every year.  If you have an ongoing health condition, like diabetes or high blood pressure, your doctor will probably want to see you more often.  What happens during a physical? -- In general, each visit will include:   Physical exam - The doctor or nurse will check your height, weight, heart rate, and blood pressure. They will also look at your eyes and ears. They will ask about how you are feeling and whether you have any symptoms that bother you.   Medicines - It's a good idea to bring a list of all the medicines you take to each doctor visit. Your doctor will talk to you about your medicines and answer any questions. Tell them if you are having any side effects that bother you. You " "should also tell them if you are having trouble paying for any of your medicines.   Habits and behaviors - This includes:   Your diet   Your exercise habits   Whether you smoke, drink alcohol, or use drugs   Whether you are sexually active   Whether you feel safe at home  Your doctor will talk to you about things you can do to improve your health and lower your risk of health problems. They will also offer help and support. For example, if you want to quit smoking, they can give you advice and might prescribe medicines. If you want to improve your diet or get more physical activity, they can help you with this, too.   Lab tests, if needed - The tests you get will depend on your age and situation. For example, your doctor might want to check your:   Cholesterol   Blood sugar   Iron level   Vaccines - The recommended vaccines will depend on your age, health, and what vaccines you already had. Vaccines are very important because they can prevent certain serious or deadly infections.   Discussion of screening - \"Screening\" means checking for diseases or other health problems before they cause symptoms. Your doctor can recommend screening based on your age, risk, and preferences. This might include tests to check for:   Cancer, such as breast, prostate, cervical, ovarian, colorectal, prostate, lung, or skin cancer   Sexually transmitted infections, such as chlamydia and gonorrhea   Mental health conditions like depression and anxiety  Your doctor will talk to you about the different types of screening tests. They can help you decide which screenings to have. They can also explain what the results might mean.   Answering questions - The physical is a good time to ask the doctor or nurse questions about your health. If needed, they can refer you to other doctors or specialists, too.  Adults older than 65 years often need other care, too. As you get older, your doctor will talk to you about:   How to prevent falling at " home   Hearing or vision tests   Memory testing   How to take your medicines safely   Making sure that you have the help and support you need at home  All topics are updated as new evidence becomes available and our peer review process is complete.  This topic retrieved from [employer] on: May 02, 2024.  Topic 829573 Version 1.0  Release: 32.4.3 - C32.122  © 2024 UpToDate, Inc. and/or its affiliates. All rights reserved.  Consumer Information Use and Disclaimer   Disclaimer: This generalized information is a limited summary of diagnosis, treatment, and/or medication information. It is not meant to be comprehensive and should be used as a tool to help the user understand and/or assess potential diagnostic and treatment options. It does NOT include all information about conditions, treatments, medications, side effects, or risks that may apply to a specific patient. It is not intended to be medical advice or a substitute for the medical advice, diagnosis, or treatment of a health care provider based on the health care provider's examination and assessment of a patient's specific and unique circumstances. Patients must speak with a health care provider for complete information about their health, medical questions, and treatment options, including any risks or benefits regarding use of medications. This information does not endorse any treatments or medications as safe, effective, or approved for treating a specific patient. UpToDate, Inc. and its affiliates disclaim any warranty or liability relating to this information or the use thereof.The use of this information is governed by the Terms of Use, available at https://www.woltersPure life renaluwer.com/en/know/clinical-effectiveness-terms. 2024© UpToDate, Inc. and its affiliates and/or licensors. All rights reserved.  Copyright   © 2024 UpToDate, Inc. and/or its affiliates. All rights reserved.

## 2025-02-07 NOTE — ASSESSMENT & PLAN NOTE
Stable. Patient had right nephrectomy in 2018. Will check GFR.   Orders:  •  Basic metabolic panel; Future

## 2025-02-07 NOTE — PROGRESS NOTES
Name: Luis Lerner      : 1960      MRN: [id number]  Encounter Provider: William Gunn MD  Encounter Date: 2025   Encounter department: [facility]    Assessment & Plan  Encounter for annual wellness visit  CPE done. Last Tdap was in . Had COVID vaccines. Recommend flu shot, Prevnar 20, and Shingrix series. Will recheck lipids. Other labs up to date. Last colonoscopy was in 2021 by Dr Ayala and patient had 1 polyp. Next one is due in 2026. Pt advised to follow a well balanced, heart healthy diet and to exercise on a regular basis. Not a smoker. Blood pressure ok.        Anxiety  Patient gets symptoms when goes to dentist and flies. Will give xanax 0.5 mg to take 30 minutes before as needed.   Orders:  •  ALPRAZolam (XANAX) 0.5 mg tablet; Take 1 tablet (0.5 mg total) by mouth 3 (three) times a day as needed for anxiety    Hypercholesterolemia  LDL was 190 in 2024. Recheck lipids and to consider starting medication. Advised pt to follow a low cholesterol diet and to exercise on a regular basis.   Orders:  •  Lipid panel; Future    Elevated PSA  PSA lower at 15.7 in 2024. Patient has been seeing Urology. Continue management per them. Patient advised to schedule follow-up visit.        Renal cell adenocarcinoma, right (HCC)  Stable. Patient had right nephrectomy in 2018. Will check GFR.   Orders:  •  Basic metabolic panel; Future         History of Present Illness     Patient here for wellness exam. Patient doing ok. No chest pain or shortness of breath. No headaches. No abdominal pain. Last Tdap was in . Had COVID vaccines. Last colonoscopy was in  and patient had 1 polyp. Exercises at times. Not a smoker. Occasional ETOH use.       Review of Systems   Constitutional:  Negative for activity change, appetite change, fatigue and unexpected weight change.   HENT:  Negative for congestion, ear pain, rhinorrhea, sore throat and trouble  swallowing.    Eyes:  Negative for pain, discharge and visual disturbance.   Respiratory:  Negative for cough, shortness of breath and wheezing.    Cardiovascular:  Negative for chest pain, palpitations and leg swelling.   Gastrointestinal:  Negative for abdominal pain, constipation, diarrhea, nausea and vomiting.   Endocrine: Negative for polydipsia, polyphagia and polyuria.   Genitourinary:  Negative for difficulty urinating and hematuria.   Musculoskeletal:  Positive for arthralgias. Negative for back pain, gait problem, myalgias and neck pain.   Skin:  Negative for color change and rash.   Neurological:  Negative for dizziness, weakness and headaches.   Hematological:  Negative for adenopathy. Does not bruise/bleed easily.   Psychiatric/Behavioral:  Negative for dysphoric mood and sleep disturbance. The patient is not nervous/anxious.      Past Medical History:   Diagnosis Date   • Acquired absence of kidney    • Acute prostatitis    • BPH with obstruction/lower urinary tract symptoms    • BPH without obstruction/lower urinary tract symptoms    • Cancer (HCC) June 2016    Kidney removed.   • Colon polyp    • Elevated PSA    • Family history of malignant neoplasm of kidney    • Malignant neoplasm of unspecified kidney, except renal pelvis (HCC)    • Other microscopic hematuria    • Other specified disorders of kidney and ureter    • Personal history of other malignant neoplasm of kidney      Past Surgical History:   Procedure Laterality Date   • COLONOSCOPY     • PROSTATE BIOPSY     • RADICAL NEPHRECTOMY Right 2016   • VASECTOMY       Family History   Problem Relation Age of Onset   • Breast cancer Mother    • Heart failure Mother    • Atrial fibrillation Mother    • Cancer Mother    • Aneurysm Father         brain   • Cancer Father      Social History     Tobacco Use   • Smoking status: Never   • Smokeless tobacco: Never   Vaping Use   • Vaping status: Never Used   Substance and Sexual Activity   • Alcohol use:  "Yes     Alcohol/week: 6.0 standard drinks of alcohol     Types: 4 Glasses of wine, 2 Standard drinks or equivalent per week     Comment: socially    • Drug use: No   • Sexual activity: Yes     Partners: Female     Birth control/protection: Male Sterilization     Current Outpatient Medications on File Prior to Visit   Medication Sig   • tamsulosin (FLOMAX) 0.4 mg    • [DISCONTINUED] baclofen 10 mg tablet Take 1 tablet (10 mg total) by mouth 3 (three) times a day   • [DISCONTINUED] methylPREDNISolone 4 MG tablet therapy pack Use as directed on package     No Known Allergies  Immunization History   Administered Date(s) Administered   • COVID-19 PFIZER VACCINE 0.3 ML IM 03/19/2021, 04/08/2021, 12/01/2021   • Tdap 01/15/2016     Objective   /74 (BP Location: Left arm, Patient Position: Sitting, Cuff Size: Standard)   Pulse 80   Resp 16   Ht 6' 1\" (1.854 m)   Wt 92.1 kg (203 lb)   SpO2 97%   BMI 26.78 kg/m²     Physical Exam  Vitals and nursing note reviewed.   Constitutional:       General: He is not in acute distress.     Appearance: Normal appearance. He is well-developed. He is not ill-appearing.   HENT:      Head: Normocephalic and atraumatic.      Right Ear: Tympanic membrane, ear canal and external ear normal.      Left Ear: Tympanic membrane, ear canal and external ear normal.      Nose: Nose normal. No congestion or rhinorrhea.      Mouth/Throat:      Mouth: Mucous membranes are moist.      Pharynx: Oropharynx is clear. No posterior oropharyngeal erythema.   Eyes:      General:         Right eye: No discharge.      Conjunctiva/sclera: Conjunctivae normal.      Pupils: Pupils are equal, round, and reactive to light.   Neck:      Thyroid: No thyromegaly.   Cardiovascular:      Rate and Rhythm: Normal rate and regular rhythm.      Heart sounds: No murmur heard.  Pulmonary:      Effort: Pulmonary effort is normal. No respiratory distress.      Breath sounds: Normal breath sounds. No wheezing.   Abdominal: "      General: Bowel sounds are normal.      Palpations: Abdomen is soft. There is no mass.      Tenderness: There is no abdominal tenderness.   Musculoskeletal:         General: No swelling or deformity. Normal range of motion.      Cervical back: Normal range of motion and neck supple. No tenderness.      Right lower leg: No edema.      Left lower leg: No edema.   Lymphadenopathy:      Cervical: No cervical adenopathy.   Skin:     General: Skin is warm and dry.      Capillary Refill: Capillary refill takes less than 2 seconds.      Findings: No erythema or rash.   Neurological:      General: No focal deficit present.      Mental Status: He is alert and oriented to person, place, and time.      Sensory: No sensory deficit.   Psychiatric:         Mood and Affect: Mood normal.         Behavior: Behavior normal.         Thought Content: Thought content normal.         Judgment: Judgment normal.

## 2025-02-07 NOTE — TELEPHONE ENCOUNTER
Pt under care of:  Otoniel Garcia PA-C   Office Location: [facility]     Last Seen (include Follow Up recommendations of last visit- see Office Visit - Instructions): 09/16/24 follow up 6 weeks with psa due to elevation.      Pt calling due to: patient called stating he wanted to know if he had a follow up appointment with Otoniel . Informed him no appointment in the system.  Informed him he was to repeat psa in 6 weeks on his last office visit 09/16/24.  He thought it was 6 months.  He needs a new order to do psa diagnostic and he also wants psa total and free.  He did scheduled an appointment with Otoniel . First available in October.  He did not want to schedule with other provider.  Please review and advise.     Last psa 11/22/24 was 15.730      Pt can be reached at: [phone]    Appointment Details  Date:  10/01/25  Time:    11 am   Location: [facility]     Provider: Otoniel Garcia PA-C      Does the appointment need further review? (Reason) pending results of Elevated PSA results.

## 2025-02-07 NOTE — TELEPHONE ENCOUNTER
Lets see what PSA shows.  May need MRI if PSA  is elevated.  If Otoniel has any availability sooner, would recommend a sooner follow-up.

## 2025-02-11 NOTE — TELEPHONE ENCOUNTER
Called and spoke with pt. I advised he go for labs and depending on results of PSA, we will determine follow up plan. Pt verbalized understanding.       Clinical to monitor for PSA testing.

## 2025-02-13 ENCOUNTER — RESULTS FOLLOW-UP (OUTPATIENT)
Dept: FAMILY MEDICINE CLINIC | Facility: CLINIC | Age: 65
End: 2025-02-13

## 2025-02-13 ENCOUNTER — APPOINTMENT (OUTPATIENT)
Dept: LAB | Facility: AMBULARY SURGERY CENTER | Age: 65
End: 2025-02-13
Payer: COMMERCIAL

## 2025-02-13 DIAGNOSIS — E78.00 HYPERCHOLESTEROLEMIA: ICD-10-CM

## 2025-02-13 DIAGNOSIS — R97.20 ELEVATED PSA: ICD-10-CM

## 2025-02-13 DIAGNOSIS — C64.1 RENAL CELL ADENOCARCINOMA, RIGHT (HCC): ICD-10-CM

## 2025-02-13 LAB
ANION GAP SERPL CALCULATED.3IONS-SCNC: 8 MMOL/L (ref 4–13)
BUN SERPL-MCNC: 15 MG/DL (ref 5–25)
CALCIUM SERPL-MCNC: 9.9 MG/DL (ref 8.4–10.2)
CHLORIDE SERPL-SCNC: 103 MMOL/L (ref 96–108)
CHOLEST SERPL-MCNC: 294 MG/DL (ref ?–200)
CO2 SERPL-SCNC: 30 MMOL/L (ref 21–32)
CREAT SERPL-MCNC: 1.27 MG/DL (ref 0.6–1.3)
GFR SERPL CREATININE-BSD FRML MDRD: 59 ML/MIN/1.73SQ M
GLUCOSE P FAST SERPL-MCNC: 95 MG/DL (ref 65–99)
HDLC SERPL-MCNC: 50 MG/DL
LDLC SERPL CALC-MCNC: 215 MG/DL (ref 0–100)
NONHDLC SERPL-MCNC: 244 MG/DL
POTASSIUM SERPL-SCNC: 4.2 MMOL/L (ref 3.5–5.3)
PSA FREE MFR SERPL: 29.84 %
PSA FREE SERPL-MCNC: 5.58 NG/ML
PSA SERPL-MCNC: 18.71 NG/ML (ref 0–4)
SODIUM SERPL-SCNC: 141 MMOL/L (ref 135–147)
TRIGL SERPL-MCNC: 147 MG/DL (ref ?–150)

## 2025-02-13 PROCEDURE — 36415 COLL VENOUS BLD VENIPUNCTURE: CPT

## 2025-02-13 PROCEDURE — 80048 BASIC METABOLIC PNL TOTAL CA: CPT

## 2025-02-13 PROCEDURE — 80061 LIPID PANEL: CPT

## 2025-02-13 PROCEDURE — 84154 ASSAY OF PSA FREE: CPT

## 2025-02-13 PROCEDURE — 84153 ASSAY OF PSA TOTAL: CPT

## 2025-02-13 NOTE — TELEPHONE ENCOUNTER
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 Has recovered to 15 and now 18. Baseline seems closer to 12.  Lab Results   Component Value Date    PSA 18.710 (H) 02/13/2025    PSA 15.730 (H) 11/22/2024    PSA 24.204 (H) 09/13/2024       recommend repeating MRI for comparison to start. I will order the MRI now and follow the results. if negative then he can keep f/u with Otoniel in september

## 2025-02-18 ENCOUNTER — RESULTS FOLLOW-UP (OUTPATIENT)
Dept: UROLOGY | Facility: CLINIC | Age: 65
End: 2025-02-18

## 2025-02-18 ENCOUNTER — TELEPHONE (OUTPATIENT)
Age: 65
End: 2025-02-18

## 2025-02-18 DIAGNOSIS — R97.20 ELEVATED PSA: Primary | ICD-10-CM

## 2025-02-18 NOTE — TELEPHONE ENCOUNTER
Rey from [facility] called stating the patient is scheduled at their facility on 2/27/2025 for MRI prostate and requires a prior authorization.     NPI [id number]

## 2025-02-18 NOTE — TELEPHONE ENCOUNTER
FYI:    Pt called back regarding lab results, I relay information, pt is aware.        Pt also needs the medication that the PCP stated in the notes below    Please advise     Pharm is confirmed.

## 2025-02-19 DIAGNOSIS — E78.00 HYPERCHOLESTEROLEMIA: Primary | ICD-10-CM

## 2025-02-19 RX ORDER — ROSUVASTATIN CALCIUM 10 MG/1
10 TABLET, COATED ORAL DAILY
Qty: 90 TABLET | Refills: 2 | Status: SHIPPED | OUTPATIENT
Start: 2025-02-19

## 2025-02-19 NOTE — TELEPHONE ENCOUNTER
Megan Prado       2/19/25  8:21 AM  Note     Summary: pt requesting to speak w Otoniel to clarify his plan of care.   Patient called stating he is getting conflicted messages in regards to have he needs to do.  He has his latest psa done 18.710.  He was told to schedule mri .  He has it scheduled for 03/11/25 at [facility].  He needs clarification on what it is he needs to do.  He was told to repeat psa in 3 months.  He wants to speak to Otoniel directly to get the right plan of care.  He stated he is getting to many people telling different things.  He wants Otoniel to call him as soon as possible since he has his mri already scheduled. .  Please review and call patient.       Patient can be reached [phone]

## 2025-02-19 NOTE — TELEPHONE ENCOUNTER
Patient called stating he is getting conflicted messages in regards to have he needs to do.  He has his latest psa done 18.710.  He was told to schedule mri .  He has it scheduled for 03/11/25 at [facility].  He needs clarification on what it is he needs to do.  He was told to repeat psa in 3 months.  He wants to speak to Otoniel directly to get the right plan of care.  He stated he is getting to many people telling different things.  He wants Otoniel to call him as soon as possible since he has his mri already scheduled. .  Please review and call patient.      Patient can be reached [phone]

## 2025-02-28 ENCOUNTER — TELEPHONE (OUTPATIENT)
Age: 65
End: 2025-02-28

## 2025-02-28 DIAGNOSIS — E78.00 HYPERCHOLESTEROLEMIA: Primary | ICD-10-CM

## 2025-02-28 NOTE — TELEPHONE ENCOUNTER
The patient received an email from [facility] promoting Coronary Calcium Score CT and the patient called and was told that he needed an order. The patient would like Dr. Gunn to give him an order for the exam.

## 2025-03-08 ENCOUNTER — HOSPITAL ENCOUNTER (OUTPATIENT)
Dept: CT IMAGING | Facility: HOSPITAL | Age: 65
Discharge: HOME | End: 2025-03-08
Attending: FAMILY MEDICINE
Payer: COMMERCIAL

## 2025-03-08 DIAGNOSIS — E78.00 HYPERCHOLESTEROLEMIA: ICD-10-CM

## 2025-03-08 PROCEDURE — 75571 CT HRT W/O DYE W/CA TEST: CPT

## 2025-03-09 PROBLEM — Z00.00 ENCOUNTER FOR ANNUAL WELLNESS VISIT: Status: RESOLVED | Noted: 2025-02-07 | Resolved: 2025-03-09

## 2025-03-11 ENCOUNTER — HOSPITAL ENCOUNTER (OUTPATIENT)
Dept: RADIOLOGY | Age: 65
Discharge: HOME | End: 2025-03-11
Payer: COMMERCIAL

## 2025-03-11 DIAGNOSIS — R97.20 ELEVATED PSA: ICD-10-CM

## 2025-03-11 PROCEDURE — 72197 MRI PELVIS W/O & W/DYE: CPT

## 2025-03-11 PROCEDURE — 76377 3D RENDER W/INTRP POSTPROCES: CPT

## 2025-03-11 PROCEDURE — A9585 GADOBUTROL INJECTION: HCPCS | Performed by: PHYSICIAN ASSISTANT

## 2025-03-11 RX ORDER — GADOBUTROL 604.72 MG/ML
9 INJECTION INTRAVENOUS
Status: COMPLETED | OUTPATIENT
Start: 2025-03-11 | End: 2025-03-11

## 2025-03-11 RX ADMIN — GADOBUTROL 9 ML: 604.72 INJECTION INTRAVENOUS at 08:49

## 2025-03-14 ENCOUNTER — RESULTS FOLLOW-UP (OUTPATIENT)
Dept: FAMILY MEDICINE CLINIC | Facility: CLINIC | Age: 65
End: 2025-03-14

## 2025-05-21 ENCOUNTER — APPOINTMENT (OUTPATIENT)
Dept: LAB | Facility: AMBULARY SURGERY CENTER | Age: 65
End: 2025-05-21
Attending: FAMILY MEDICINE
Payer: COMMERCIAL

## 2025-05-21 DIAGNOSIS — E78.00 HYPERCHOLESTEROLEMIA: ICD-10-CM

## 2025-05-21 LAB
ALBUMIN SERPL BCG-MCNC: 4.3 G/DL (ref 3.5–5)
ALP SERPL-CCNC: 56 U/L (ref 34–104)
ALT SERPL W P-5'-P-CCNC: 17 U/L (ref 7–52)
AST SERPL W P-5'-P-CCNC: 18 U/L (ref 13–39)
BILIRUB DIRECT SERPL-MCNC: 0.15 MG/DL (ref 0–0.2)
BILIRUB SERPL-MCNC: 0.69 MG/DL (ref 0.2–1)
CHOLEST SERPL-MCNC: 184 MG/DL (ref ?–200)
HDLC SERPL-MCNC: 54 MG/DL
LDLC SERPL CALC-MCNC: 111 MG/DL (ref 0–100)
NONHDLC SERPL-MCNC: 130 MG/DL
PROT SERPL-MCNC: 7.2 G/DL (ref 6.4–8.4)
TRIGL SERPL-MCNC: 94 MG/DL (ref ?–150)

## 2025-05-21 PROCEDURE — 80061 LIPID PANEL: CPT

## 2025-05-21 PROCEDURE — 36415 COLL VENOUS BLD VENIPUNCTURE: CPT

## 2025-05-21 PROCEDURE — 80076 HEPATIC FUNCTION PANEL: CPT

## (undated) RX ORDER — ERYTHROMYCIN 5 MG/G
1/4 OINTMENT OPHTHALMIC TWICE A DAY
Start: 2024-03-11